# Patient Record
Sex: FEMALE | Race: BLACK OR AFRICAN AMERICAN | NOT HISPANIC OR LATINO | Employment: UNEMPLOYED | URBAN - METROPOLITAN AREA
[De-identification: names, ages, dates, MRNs, and addresses within clinical notes are randomized per-mention and may not be internally consistent; named-entity substitution may affect disease eponyms.]

---

## 2017-02-08 ENCOUNTER — APPOINTMENT (OUTPATIENT)
Dept: OPTOMETRY | Facility: CLINIC | Age: 67
End: 2017-02-08
Payer: COMMERCIAL

## 2017-02-08 ENCOUNTER — OFFICE VISIT (OUTPATIENT)
Dept: OPTOMETRY | Facility: CLINIC | Age: 67
End: 2017-02-08
Payer: COMMERCIAL

## 2017-02-08 DIAGNOSIS — H52.03 HYPEROPIA OF BOTH EYES WITH ASTIGMATISM AND PRESBYOPIA: ICD-10-CM

## 2017-02-08 DIAGNOSIS — H52.4 HYPEROPIA OF BOTH EYES WITH ASTIGMATISM AND PRESBYOPIA: ICD-10-CM

## 2017-02-08 DIAGNOSIS — H52.203 HYPEROPIA OF BOTH EYES WITH ASTIGMATISM AND PRESBYOPIA: ICD-10-CM

## 2017-02-08 DIAGNOSIS — E11.9 TYPE 2 DIABETES MELLITUS WITHOUT RETINOPATHY (H): Primary | ICD-10-CM

## 2017-02-08 DIAGNOSIS — H26.9 CATARACTS, BOTH EYES: ICD-10-CM

## 2017-02-08 PROCEDURE — 92015 DETERMINE REFRACTIVE STATE: CPT | Performed by: OPTOMETRIST

## 2017-02-08 PROCEDURE — 92014 COMPRE OPH EXAM EST PT 1/>: CPT | Performed by: OPTOMETRIST

## 2017-02-08 PROCEDURE — 92341 FIT SPECTACLES BIFOCAL: CPT | Performed by: OPTOMETRIST

## 2017-02-08 ASSESSMENT — CONF VISUAL FIELD
OD_NORMAL: 1
OS_NORMAL: 1

## 2017-02-08 ASSESSMENT — REFRACTION_MANIFEST
OS_AXIS: 175
OD_SPHERE: +0.50
OD_AXIS: 180
OS_SPHERE: +0.25
OS_ADD: +2.50
OS_CYLINDER: +0.50
OD_CYLINDER: +0.50
OD_ADD: +2.50

## 2017-02-08 ASSESSMENT — VISUAL ACUITY
OD_CC: 20/40
OD_SC: 20/40
OS_SC: 20/40
OS_CC: 20/40
OD_CC: 20/40
METHOD: NUMBERS - LINEAR
OS_CC: 20/30

## 2017-02-08 ASSESSMENT — CUP TO DISC RATIO
OS_RATIO: 0.3
OD_RATIO: 0.3

## 2017-02-08 ASSESSMENT — REFRACTION_WEARINGRX
OS_AXIS: 175
OD_CYLINDER: +0.50
SPECS_TYPE: SVL
OS_SPHERE: PLANO
OD_SPHERE: PLANO
OS_ADD: +2.50
OS_CYLINDER: +0.50
OD_ADD: +2.50
OD_AXIS: 005

## 2017-02-08 ASSESSMENT — SLIT LAMP EXAM - LIDS
COMMENTS: NORMAL
COMMENTS: NORMAL

## 2017-02-08 ASSESSMENT — EXTERNAL EXAM - RIGHT EYE: OD_EXAM: NORMAL

## 2017-02-08 ASSESSMENT — TONOMETRY
OS_IOP_MMHG: 18
IOP_METHOD: APPLANATION
OD_IOP_MMHG: 18

## 2017-02-08 ASSESSMENT — EXTERNAL EXAM - LEFT EYE: OS_EXAM: NORMAL

## 2017-02-08 NOTE — PATIENT INSTRUCTIONS
Monitor, Keep blood sugar under control  Sent letter to primary care provider regarding diabetes  Monitor cataracts by having yearly exams.  Wear sunglasses when outside.  A final glasses prescription was given.  Allow time for adaptation.  The glasses may cause dizziness and affect depth perception for awhile.  Return to clinic 1 year for Comprehensive Vision Exam      Lisa Batista O.D  [address] [place]  [address]    [phone]

## 2017-02-08 NOTE — Clinical Note
[facility]  Optometry Department      2/8/2017    Re:  Ankush Cole  1950   [id number]    Dear Dr. Rubio,    Your patient was seen in our office on 2/8/2017 for a dilated diabetic eye exam.      Findings:    No Retinopathy  OU - Both  Mild Cataracts both eyes     Comments:  Ankush should return for a comprehensive eye exam in 1 year.    Sincerely,        Lisa Batista O.D  [facility] [address]  [place]  [address]    [phone]

## 2017-02-08 NOTE — PROGRESS NOTES
Chief Complaint   Patient presents with     Diabetic Eye Exam      Accompanied by  and daughter  A1C      6.5   12/30/2016  A1C      6.7   7/19/2016  A1C      6.3   11/16/2015  A1C      6.6   4/13/2015  A1C      6.2   1/8/2015        Accompanied by  and Accompanied by Daughter  Last Eye Exam: 05/19/2015  Dilated Previously: Yes    What are you currently using to see?  readers       Distance Vision Acuity: Satisfied with vision    Near Vision Acuity: Satisfied with vision while reading  with readers    Eye Comfort: good  Do you use eye drops? : No  Occupation or Hobbies: Retired    Renee L. F.  Opt. Tech.  2/8/2017         Medical, surgical and family histories reviewed and updated 2/8/2017.       OBJECTIVE: See Ophthalmology exam    ASSESSMENT:    ICD-10-CM    1. Type 2 diabetes mellitus without retinopathy (H) E11.9 EYE EXAM (SIMPLE-NONBILLABLE)   2. Cataracts, both eyes H26.9 EYE EXAM (SIMPLE-NONBILLABLE)   3. Hyperopia of both eyes with astigmatism and presbyopia H52.03 EYE EXAM (SIMPLE-NONBILLABLE)    H52.203 REFRACTION    H52.4       PLAN:   Monitor, Keep blood sugar under control  Sent letter to primary care provider regarding diabetes  Monitor cataracts by having yearly exams.  Wear sunglasses when outside.  A final glasses prescription was given.  Allow time for adaptation.  The glasses may cause dizziness and affect depth perception for awhile.  Return to clinic 1 year for Comprehensive Vision Exam      Lisa Batista O.D  [address]  [place]  [address]    [phone]

## 2017-02-08 NOTE — MR AVS SNAPSHOT
After Visit Summary   2/8/2017    Ankush Cole    MRN: [id number]           Patient Information     Date Of Birth          1950        Visit Information        Provider Department      2/8/2017 8:15 AM Lisa Batista OD; [employer] [facility]        Today's Diagnoses     Type 2 diabetes mellitus without retinopathy (H)    -  1     Cataracts, both eyes         Hyperopia of both eyes with astigmatism and presbyopia           Care Instructions        Monitor, Keep blood sugar under control  Sent letter to primary care provider regarding diabetes  Monitor cataracts by having yearly exams.  Wear sunglasses when outside.  A final glasses prescription was given.  Allow time for adaptation.  The glasses may cause dizziness and affect depth perception for awhile.  Return to clinic 1 year for Comprehensive Vision Exam      Lisa Batista O.D  [address] [place]  [address]    [phone]              Follow-ups after your visit        Follow-up notes from your care team     Return in about 1 year (around 2/8/2018) for Eye Exam.      Who to contact     If you have questions or need follow up information about today's clinic visit or your schedule please contact [facility] directly at [phone].  Normal or non-critical lab and imaging results will be communicated to you by MyChart, letter or phone within 4 business days after the clinic has received the results. If you do not hear from us within 7 days, please contact the clinic through [employer] or phone. If you have a critical or abnormal lab result, we will notify you by phone as soon as possible.  Submit refill requests through [employer] or call your pharmacy and they will forward the refill request to us. Please allow 3 business days for your refill to be completed.          Additional Information About Your Visit        [employer] Information     [employer] lets  "you send messages to your doctor, view your test results, renew your prescriptions, schedule appointments and more. To sign up, go to [email or web address] . Click on \"Log in\" on the left side of the screen, which will take you to the Welcome page. Then click on \"Sign up Now\" on the right side of the page.     You will be asked to enter the access code listed below, as well as some personal information. Please follow the directions to create your username and password.     Your access code is: [email or web address]  Expires: 3/30/2017  4:00 PM     Your access code will  in 90 days. If you need help or a new code, please call your [facility] clinic or [phone].        Care EveryWhere ID     This is your Care EveryWhere ID. This could be used by other organizations to access your [facility] medical records  [id number]         Blood Pressure from Last 3 Encounters:   16 140/72   16 150/68   16 118/80    Weight from Last 3 Encounters:   16 89.359 kg (197 lb)   16 92.534 kg (204 lb)   16 88.451 kg (195 lb)              We Performed the Following     EYE EXAM (SIMPLE-NONBILLABLE)     REFRACTION        Primary Care Provider Office Phone # Fax #    Sherrill Rubio [phone]       [address] [address]        Thank you!     Thank you for choosing [facility]  for your care. Our goal is always to provide you with excellent care. Hearing back from our patients is one way we can continue to improve our services. Please take a few minutes to complete the written survey that you may receive in the mail after your visit with us. Thank you!             Your Updated Medication List - Protect others around you: Learn how to safely use, store and throw away your medicines at www.disposemymeds.org.          This list is accurate as of: 17  9:34 AM.  Always use your most recent med list.                   " Brand Name Dispense Instructions for use    acetaminophen 325 MG tablet    TYLENOL    100 tablet    Take 2 tablets (650 mg) by mouth 3 times daily as needed for mild pain       ALOE VERA PO      Take 600 mg by mouth       diclofenac 1 % Gel topical gel    VOLTAREN    100 g    Apply 4 grams to knees or 2 grams to hands four times daily using enclosed dosing card.       lidocaine 5 % ointment    XYLOCAINE    30 g    Apply topically as needed for moderate pain Apply to pea side daily prn with pain       naproxen 500 MG tablet    NAPROSYN    60 tablet    Take 1 tablet (500 mg) by mouth 2 times daily as needed for moderate pain       * vitamin D 2000 UNITS tablet     100 tablet    Take 2,000 Units by mouth daily       * cholecalciferol 11521 UNITS capsule    VITAMIN D3    4 capsule    Take 1 capsule (50,000 Units) by mouth once a week       * Notice:  This list has 2 medication(s) that are the same as other medications prescribed for you. Read the directions carefully, and ask your doctor or other care provider to review them with you.

## 2017-03-22 ENCOUNTER — TRANSFERRED RECORDS (OUTPATIENT)
Dept: HEALTH INFORMATION MANAGEMENT | Facility: CLINIC | Age: 67
End: 2017-03-22

## 2017-03-24 ENCOUNTER — OFFICE VISIT (OUTPATIENT)
Dept: FAMILY MEDICINE | Facility: CLINIC | Age: 67
End: 2017-03-24
Payer: COMMERCIAL

## 2017-03-24 VITALS
DIASTOLIC BLOOD PRESSURE: 74 MMHG | HEIGHT: 61 IN | HEART RATE: 72 BPM | TEMPERATURE: 98 F | BODY MASS INDEX: 37.43 KG/M2 | SYSTOLIC BLOOD PRESSURE: 134 MMHG | WEIGHT: 198.25 LBS

## 2017-03-24 DIAGNOSIS — Z00.00 ENCOUNTER FOR ROUTINE ADULT HEALTH EXAMINATION WITHOUT ABNORMAL FINDINGS: Primary | ICD-10-CM

## 2017-03-24 DIAGNOSIS — E78.00 ELEVATED CHOLESTEROL: ICD-10-CM

## 2017-03-24 DIAGNOSIS — M81.0 OSTEOPOROSIS: ICD-10-CM

## 2017-03-24 DIAGNOSIS — E11.9 TYPE 2 DIABETES MELLITUS WITHOUT COMPLICATION, WITHOUT LONG-TERM CURRENT USE OF INSULIN (H): ICD-10-CM

## 2017-03-24 DIAGNOSIS — E04.1 THYROID NODULE: ICD-10-CM

## 2017-03-24 DIAGNOSIS — E55.9 VITAMIN D DEFICIENCY: ICD-10-CM

## 2017-03-24 DIAGNOSIS — M17.9 OSTEOARTHRITIS OF KNEE, UNSPECIFIED LATERALITY, UNSPECIFIED OSTEOARTHRITIS TYPE: ICD-10-CM

## 2017-03-24 DIAGNOSIS — D25.9 UTERINE LEIOMYOMA, UNSPECIFIED LOCATION: ICD-10-CM

## 2017-03-24 LAB — HBA1C MFR BLD: 6.2 % (ref 4.3–6)

## 2017-03-24 PROCEDURE — 83036 HEMOGLOBIN GLYCOSYLATED A1C: CPT | Performed by: FAMILY MEDICINE

## 2017-03-24 PROCEDURE — 80061 LIPID PANEL: CPT | Performed by: FAMILY MEDICINE

## 2017-03-24 PROCEDURE — 82306 VITAMIN D 25 HYDROXY: CPT | Performed by: FAMILY MEDICINE

## 2017-03-24 PROCEDURE — 36415 COLL VENOUS BLD VENIPUNCTURE: CPT | Performed by: FAMILY MEDICINE

## 2017-03-24 PROCEDURE — 99397 PER PM REEVAL EST PAT 65+ YR: CPT | Performed by: FAMILY MEDICINE

## 2017-03-24 RX ORDER — OMEGA-3-ACID ETHYL ESTERS 1 G/1
2 CAPSULE, LIQUID FILLED ORAL DAILY
Qty: 120 CAPSULE | Refills: 0 | Status: SHIPPED | OUTPATIENT
Start: 2017-03-24 | End: 2018-05-02

## 2017-03-24 RX ORDER — IBUPROFEN 600 MG/1
600 TABLET, FILM COATED ORAL EVERY 6 HOURS PRN
Qty: 60 TABLET | Refills: 3 | Status: SHIPPED | OUTPATIENT
Start: 2017-03-24 | End: 2018-05-02

## 2017-03-24 NOTE — PATIENT INSTRUCTIONS
Think about starting cholesterol med  We will recheck today  Follow up 6 months    Preventive Health Recommendations  Female Ages 65 +    Yearly exam:     See your health care provider every year in order to  o Review health changes.   o Discuss preventive care.    o Review your medicines if your doctor has prescribed any.      You no longer need a yearly Pap test unless you've had an abnormal Pap test in the past 10 years. If you have vaginal symptoms, such as bleeding or discharge, be sure to talk with your provider about a Pap test.      Every 1 to 2 years, have a mammogram.  If you are over 69, talk with your health care provider about whether or not you want to continue having screening mammograms.      Every 10 years, have a colonoscopy. Or, have a yearly FIT test (stool test). These exams will check for colon cancer.       Have a cholesterol test every 5 years, or more often if your doctor advises it.       Have a diabetes test (fasting glucose) every three years. If you are at risk for diabetes, you should have this test more often.       At age 65, have a bone density scan (DEXA) to check for osteoporosis (brittle bone disease).    Shots:    Get a flu shot each year.    Get a tetanus shot every 10 years.    Talk to your doctor about your pneumonia vaccines. There are now two you should receive - Pneumovax (PPSV 23) and Prevnar (PCV 13).    Talk to your doctor about the shingles vaccine.    Talk to your doctor about the hepatitis B vaccine.    Nutrition:     Eat at least 5 servings of fruits and vegetables each day.      Eat whole-grain bread, whole-wheat pasta and brown rice instead of white grains and rice.      Talk to your provider about Calcium and Vitamin D.     Lifestyle    Exercise at least 150 minutes a week (30 minutes a day, 5 days a week). This will help you control your weight and prevent disease.      Limit alcohol to one drink per day.      No smoking.       Wear sunscreen to prevent skin  cancer.       See your dentist twice a year for an exam and cleaning.      See your eye doctor every 1 to 2 years to screen for conditions such as glaucoma, macular degeneration, cataracts, etc

## 2017-03-24 NOTE — PROGRESS NOTES
SUBJECTIVE:                                                            Ankush Cole is a 67 year old female who presents for Preventive Visit.      Are you in the first 12 months of your Medicare Part B coverage?  No    Healthy Habits:    Do you get at least three servings of calcium containing foods daily (dairy, green leafy vegetables, etc.)? No    Amount of exercise or daily activities, outside of work: walking    Problems taking medications regularly No    Medication side effects: No    Have you had an eye exam in the past two years? yes    Do you see a dentist twice per year? yes    Do you have sleep apnea, excessive snoring or daytime drowsiness?no    COGNITIVE SCREEN  1) Repeat 3 items (Banana, Sunrise, Chair)  - normal passed  2) Clock draw: unable to do  3) 3 item recall: passed  Results: N/A    Mini-CogTM Copyright S Rudy. Licensed by the author for use in [facility]; reprinted with permission (ryan@[facility]). All rights reserved.      Doing well, no compliats  DIABETES MELLITUS -well controlled, eating better, walking more, no meds            Reviewed and updated as needed this visit by clinical staff  Tobacco  Allergies  Med Hx  Surg Hx  Fam Hx  Soc Hx        Reviewed and updated as needed this visit by Provider        Social History   Substance Use Topics     Smoking status: Never Smoker     Smokeless tobacco: Never Used     Alcohol use No       The patient does not drink >3 drinks per day nor >7 drinks per week.    Today's PHQ-2 Score:   PHQ-2 ( 1999 Pfizer) 3/24/2017 12/30/2016   Q1: Little interest or pleasure in doing things 0 0   Q2: Feeling down, depressed or hopeless 0 0   PHQ-2 Score 0 0       Do you feel safe in your environment - Yes    Do you have a Health Care Directive?: Has verbally with someone    Current providers sharing in care for this patient include:   Patient Care Team:  Sherrill Rubio DO as PCP - General (Family Practice)  Suzette Blanc  MD as MD (Student in organized health care education/training program)      Hearing impairment: No    Ability to successfully perform activities of daily living: Yes, no assistance needed     Fall risk:  Fallen 2 or more times in the past year?: No  Any fall with injury in the past year?: No    Home safety:  none identified      The following health maintenance items are reviewed in Epic and correct as of today:  Health Maintenance   Topic Date Due     ADVANCE DIRECTIVE PLANNING Q5 YRS (NO INBASKET)  03/23/1966     A1C Q6 MO( NO INBASKET)  06/30/2017     CREATININE Q1 YEAR (NO INBASKET)  07/19/2017     FALL RISK ASSESSMENT  07/19/2017     LIPID MONITORING Q1 YEAR( NO INBASKET)  07/19/2017     MICROALBUMIN Q1 YEAR( NO INBASKET)  07/19/2017     INFLUENZA VACCINE (SYSTEM ASSIGNED)  09/01/2017     PAP Q3 YR  09/18/2017     HPV Q3 Years  09/18/2017     FOOT EXAM Q1 YEAR( NO INBASKET)  12/30/2017     EYE EXAM Q1 YEAR( NO INBASKET)  02/08/2018     MAMMO SCREEN Q2 YR (SYSTEM ASSIGNED)  07/29/2018     TSH W/ FREE T4 REFLEX Q2 YEAR (NO INBASKET)  09/20/2018     TETANUS IMMUNIZATION (SYSTEM ASSIGNED)  09/18/2024     COLON CANCER SCREEN (SYSTEM ASSIGNED)  03/22/2027     DEXA SCAN SCREENING (SYSTEM ASSIGNED)  Completed     PNEUMOCOCCAL  Completed     HEPATITIS C SCREENING  Completed         Pneumonia Vaccine:Adults age 65+ who have not received previous Pneumovax (PPSV23) or PCV13 as an adult: Should first be given PCV13 AND then should be given PPSV23 6-12 months after PCV13     ROS:  Constitutional, HEENT, cardiovascular, pulmonary, GI, , musculoskeletal, neuro, skin, endocrine and psych systems are negative, except as otherwise noted.    Problem list, Medication list, Allergies, and Medical/Social/Surgical histories reviewed in [facility] and updated as appropriate.  OBJECTIVE:                                                            /74 (BP Location: Right arm, Cuff Size: Adult Regular)  Pulse 72  Temp 98  F (36.7  C)  "(Oral)  Ht 5' 1\" (1.549 m)  Wt 198 lb 4 oz (89.9 kg)  BMI 37.46 kg/m2 Estimated body mass index is 37.46 kg/(m^2) as calculated from the following:    Height as of this encounter: 5' 1\" (1.549 m).    Weight as of this encounter: 198 lb 4 oz (89.9 kg).  EXAM:   GENERAL: healthy, alert and no distress  EYES: Eyes grossly normal to inspection, PERRL and conjunctivae and sclerae normal  HENT: ear canals and TM's normal, nose and mouth without ulcers or lesions  NECK: no adenopathy, no asymmetry, masses, or scars and thyroid normal to palpation  RESP: lungs clear to auscultation - no rales, rhonchi or wheezes  BREAST: normal without masses, tenderness or nipple discharge and no palpable axillary masses or adenopathy  CV: regular rate and rhythm, normal S1 S2, no S3 or S4, no murmur, click or rub, no peripheral edema and peripheral pulses strong  ABDOMEN: soft, nontender, no hepatosplenomegaly, no masses and bowel sounds normal  MS: no gross musculoskeletal defects noted, no edema  SKIN: no suspicious lesions or rashes  NEURO: Normal strength and tone, mentation intact and speech normal  PSYCH: mentation appears normal, affect normal/bright    ASSESSMENT / PLAN:                                                                ICD-10-CM    1. Type 2 diabetes mellitus without complication (H) E11.9 HEMOGLOBIN A1C     Basic metabolic panel   2. Uterine leiomyoma, unspecified location D25.9    3. Thyroid nodule E04.1    4. Osteoporosis M81.0    5. Elevated cholesterol E78.00 omega-3 acid ethyl esters (LOVAZA) 1 G capsule     Lipid Profile with reflex to direct LDL   6. Osteoarthritis of knee, unspecified laterality, unspecified osteoarthritis type M17.9 ibuprofen (ADVIL/MOTRIN) 600 MG tablet   7. Vitamin D deficiency E55.9 Vitamin D Deficiency     cholecalciferol (VITAMIN D3) 86517 UNITS capsule     Diet controlled diabetes mellitus-stable, get eye exam done  History of fibroids, no symptoms  Vit D def-cont " "supplements  Arthritis pain-stable    End of Life Planning:  Patient currently has an advanced directive: Yes.  Practitioner is supportive of decision.    COUNSELING:  Reviewed preventive health counseling, as reflected in patient instructions        Estimated body mass index is 37.46 kg/(m^2) as calculated from the following:    Height as of this encounter: 5' 1\" (1.549 m).    Weight as of this encounter: 198 lb 4 oz (89.9 kg).     reports that she has never smoked. She has never used smokeless tobacco.      Appropriate preventive services were discussed with this patient, including applicable screening as appropriate for cardiovascular disease, diabetes, osteopenia/osteoporosis, and glaucoma.  As appropriate for age/gender, discussed screening for colorectal cancer, prostate cancer, breast cancer, and cervical cancer. Checklist reviewing preventive services available has been given to the patient.    Reviewed patients plan of care and provided an AVS. The Basic Care Plan (routine screening as documented in Health Maintenance) for Ankush meets the Care Plan requirement. This Care Plan has been established and reviewed with the Patient.    Counseling Resources:  ATP IV Guidelines  Pooled Cohorts Equation Calculator  Breast Cancer Risk Calculator  FRAX Risk Assessment  ICSI Preventive Guidelines  Dietary Guidelines for Americans, 2010  USDA's MyPlate  ASA Prophylaxis  Lung CA Screening    Sherrill Rubio DO  [facility]  "

## 2017-03-24 NOTE — LETTER
[facility]  [address] [address] [phone]      April 10, 2017      Ankush Cole  [address]  [place] [address]          Dear Ms. Cole    The results of your recent lab tests were within normal limits. Enclosed is a copy of these results.  If you have any further questions or problems, please contact our office.    Results for orders placed or performed in visit on 03/24/17   HEMOGLOBIN A1C   Result Value Ref Range    Hemoglobin A1C 6.2 (H) 4.3 - 6.0 %   Vitamin D Deficiency   Result Value Ref Range    Vitamin D Deficiency screening 45 20 - 75 ug/L   Lipid Profile with reflex to direct LDL   Result Value Ref Range    Cholesterol 184 <200 mg/dL    Triglycerides 54 <150 mg/dL    HDL Cholesterol 71 >49 mg/dL    LDL Cholesterol Calculated 102 (H) <100 mg/dL    Non HDL Cholesterol 113 <130 mg/dL         Sincerely,      Sherrill Rubio DO/ashely

## 2017-03-24 NOTE — MR AVS SNAPSHOT
After Visit Summary   3/24/2017    Ankush Cole    MRN: [id number]           Patient Information     Date Of Birth          1950        Visit Information        Provider Department      3/24/2017 10:30 AM Sherrill Rubio DO; [employer] [facility]        Today's Diagnoses     Type 2 diabetes mellitus without complication (H)    -  1    Uterine leiomyoma, unspecified location        Thyroid nodule        Osteoporosis        Elevated cholesterol        Osteoarthritis of knee, unspecified laterality, unspecified osteoarthritis type        Vitamin D deficiency          Care Instructions    Think about starting cholesterol med  We will recheck today  Follow up 6 months    Preventive Health Recommendations  Female Ages 65 +    Yearly exam:     See your health care provider every year in order to  o Review health changes.   o Discuss preventive care.    o Review your medicines if your doctor has prescribed any.      You no longer need a yearly Pap test unless you've had an abnormal Pap test in the past 10 years. If you have vaginal symptoms, such as bleeding or discharge, be sure to talk with your provider about a Pap test.      Every 1 to 2 years, have a mammogram.  If you are over 69, talk with your health care provider about whether or not you want to continue having screening mammograms.      Every 10 years, have a colonoscopy. Or, have a yearly FIT test (stool test). These exams will check for colon cancer.       Have a cholesterol test every 5 years, or more often if your doctor advises it.       Have a diabetes test (fasting glucose) every three years. If you are at risk for diabetes, you should have this test more often.       At age 65, have a bone density scan (DEXA) to check for osteoporosis (brittle bone disease).    Shots:    Get a flu shot each year.    Get a tetanus shot every 10 years.    Talk to your doctor about your pneumonia vaccines.  "There are now two you should receive - Pneumovax (PPSV 23) and Prevnar (PCV 13).    Talk to your doctor about the shingles vaccine.    Talk to your doctor about the hepatitis B vaccine.    Nutrition:     Eat at least 5 servings of fruits and vegetables each day.      Eat whole-grain bread, whole-wheat pasta and brown rice instead of white grains and rice.      Talk to your provider about Calcium and Vitamin D.     Lifestyle    Exercise at least 150 minutes a week (30 minutes a day, 5 days a week). This will help you control your weight and prevent disease.      Limit alcohol to one drink per day.      No smoking.       Wear sunscreen to prevent skin cancer.       See your dentist twice a year for an exam and cleaning.      See your eye doctor every 1 to 2 years to screen for conditions such as glaucoma, macular degeneration, cataracts, etc         Follow-ups after your visit        Who to contact     If you have questions or need follow up information about today's clinic visit or your schedule please contact [facility] directly at [phone].  Normal or non-critical lab and imaging results will be communicated to you by MyChart, letter or phone within 4 business days after the clinic has received the results. If you do not hear from us within 7 days, please contact the clinic through Crocshart or phone. If you have a critical or abnormal lab result, we will notify you by phone as soon as possible.  Submit refill requests through Crashly[employer] or call your pharmacy and they will forward the refill request to us. Please allow 3 business days for your refill to be completed.          Additional Information About Your Visit        [employer] Information     [employer] lets you send messages to your doctor, view your test results, renew your prescriptions, schedule appointments and more. To sign up, go to [email or web address] . Click on \"Log in\" on the left side of the screen, which will take you to the " "Welcome page. Then click on \"Sign up Now\" on the right side of the page.     You will be asked to enter the access code listed below, as well as some personal information. Please follow the directions to create your username and password.     Your access code is: [email or web address]  Expires: 3/30/2017  5:00 PM     Your access code will  in 90 days. If you need help or a new code, please call your [facility] clinic or [phone].        Care EveryWhere ID     This is your Care EveryWhere ID. This could be used by other organizations to access your [facility] medical records  [id number]        Your Vitals Were     Pulse Temperature Height BMI (Body Mass Index)          72 98  F (36.7  C) (Oral) 5' 1\" (1.549 m) 37.46 kg/m2         Blood Pressure from Last 3 Encounters:   17 134/74   16 140/72   16 150/68    Weight from Last 3 Encounters:   17 198 lb 4 oz (89.9 kg)   16 197 lb (89.4 kg)   16 204 lb (92.5 kg)              We Performed the Following     HEMOGLOBIN A1C     Lipid Profile with reflex to direct LDL     Vitamin D Deficiency          Today's Medication Changes          These changes are accurate as of: 3/24/17 11:43 AM.  If you have any questions, ask your nurse or doctor.               Start taking these medicines.        Dose/Directions    ibuprofen 600 MG tablet   Commonly known as:  ADVIL/MOTRIN   Used for:  Osteoarthritis of knee, unspecified laterality, unspecified osteoarthritis type   Started by:  Sherrill Rubio DO        Dose:  600 mg   Take 1 tablet (600 mg) by mouth every 6 hours as needed for moderate pain   Quantity:  60 tablet   Refills:  3       omega-3 acid ethyl esters 1 G capsule   Commonly known as:  Lovaza   Used for:  Elevated cholesterol   Started by:  Sherrill Rubio DO        Dose:  2 g   Take 2 capsules (2 g) by mouth daily   Quantity:  120 capsule   Refills:  0         These medicines have changed or have updated prescriptions.     "    Dose/Directions    * vitamin D 2000 UNITS tablet   This may have changed:  Another medication with the same name was added. Make sure you understand how and when to take each.   Used for:  Vitamin D deficiency   Changed by:  Sherrill Rubio DO        Dose:  2000 Units   Take 2,000 Units by mouth daily   Quantity:  100 tablet   Refills:  3       * cholecalciferol 47496 UNITS capsule   Commonly known as:  VITAMIN D3   This may have changed:  Another medication with the same name was added. Make sure you understand how and when to take each.   Used for:  Vitamin D deficiency   Changed by:  Sherrill Rubio DO        Dose:  1 capsule   Take 1 capsule (50,000 Units) by mouth once a week   Quantity:  4 capsule   Refills:  5       * cholecalciferol 12609 UNITS capsule   Commonly known as:  VITAMIN D3   This may have changed:  You were already taking a medication with the same name, and this prescription was added. Make sure you understand how and when to take each.   Used for:  Vitamin D deficiency   Changed by:  Sherrill Rubio DO        Dose:  1 capsule   Take 1 capsule (50,000 Units) by mouth once a week   Quantity:  10 capsule   Refills:  1       * Notice:  This list has 3 medication(s) that are the same as other medications prescribed for you. Read the directions carefully, and ask your doctor or other care provider to review them with you.         Where to get your medicines      These medications were sent to [facility] [address].  [address] [place] [address], [place] [id number]     Phone:  [phone]     cholecalciferol 75961 UNITS capsule    ibuprofen 600 MG tablet    omega-3 acid ethyl esters 1 G capsule                Primary Care Provider Office Phone # Fax #    Sherrill Rubio [phone]       [address] [address]        Thank you!     Thank you for  choosing [facility] [place] [facility]  for your care. Our goal is always to provide you with excellent care. Hearing back from our patients is one way we can continue to improve our services. Please take a few minutes to complete the written survey that you may receive in the mail after your visit with us. Thank you!             Your Updated Medication List - Protect others around you: Learn how to safely use, store and throw away your medicines at www.disposemymeds.org.          This list is accurate as of: 3/24/17 11:43 AM.  Always use your most recent med list.                   Brand Name Dispense Instructions for use    acetaminophen 325 MG tablet    TYLENOL    100 tablet    Take 2 tablets (650 mg) by mouth 3 times daily as needed for mild pain       ALOE VERA PO      Take 600 mg by mouth       diclofenac 1 % Gel topical gel    VOLTAREN    100 g    Apply 4 grams to knees or 2 grams to hands four times daily using enclosed dosing card.       ibuprofen 600 MG tablet    ADVIL/MOTRIN    60 tablet    Take 1 tablet (600 mg) by mouth every 6 hours as needed for moderate pain       lidocaine 5 % ointment    XYLOCAINE    30 g    Apply topically as needed for moderate pain Apply to pea side daily prn with pain       naproxen 500 MG tablet    NAPROSYN    60 tablet    Take 1 tablet (500 mg) by mouth 2 times daily as needed for moderate pain       omega-3 acid ethyl esters 1 G capsule    Lovaza    120 capsule    Take 2 capsules (2 g) by mouth daily       * vitamin D 2000 UNITS tablet     100 tablet    Take 2,000 Units by mouth daily       * cholecalciferol 83348 UNITS capsule    VITAMIN D3    4 capsule    Take 1 capsule (50,000 Units) by mouth once a week       * cholecalciferol 93545 UNITS capsule    VITAMIN D3    10 capsule    Take 1 capsule (50,000 Units) by mouth once a week       * Notice:  This list has 3 medication(s) that are the same as other medications prescribed for you. Read the directions carefully, and ask  your doctor or other care provider to review them with you.

## 2017-03-24 NOTE — NURSING NOTE
"Chief Complaint   Patient presents with     Physical       Initial /74 (BP Location: Right arm, Cuff Size: Adult Regular)  Pulse 72  Temp 98  F (36.7  C) (Oral)  Ht 5' 1\" (1.549 m)  Wt 198 lb 4 oz (89.9 kg)  BMI 37.46 kg/m2 Estimated body mass index is 37.46 kg/(m^2) as calculated from the following:    Height as of this encounter: 5' 1\" (1.549 m).    Weight as of this encounter: 198 lb 4 oz (89.9 kg).  Medication Reconciliation: jaret WILKES, Certified Medical Assistant (AAMA)March 24, 2017 11:08 AM      "

## 2017-03-25 LAB
CHOLEST SERPL-MCNC: 184 MG/DL
HDLC SERPL-MCNC: 71 MG/DL
LDLC SERPL CALC-MCNC: 102 MG/DL
NONHDLC SERPL-MCNC: 113 MG/DL
TRIGL SERPL-MCNC: 54 MG/DL

## 2017-03-27 LAB — DEPRECATED CALCIDIOL+CALCIFEROL SERPL-MC: 45 UG/L (ref 20–75)

## 2017-03-28 PROBLEM — E11.9 TYPE 2 DIABETES MELLITUS WITHOUT COMPLICATION, WITHOUT LONG-TERM CURRENT USE OF INSULIN (H): Status: ACTIVE | Noted: 2017-03-28

## 2017-05-09 ENCOUNTER — TELEPHONE (OUTPATIENT)
Dept: FAMILY MEDICINE | Facility: CLINIC | Age: 67
End: 2017-05-09

## 2017-05-09 NOTE — TELEPHONE ENCOUNTER
Panel Management Review      Patient has the following on her problem list:     Diabetes    ASA:  Not on File    Last A1C  Lab Results   Component Value Date    A1C 6.2 03/24/2017    A1C 6.5 12/30/2016    A1C 6.7 07/19/2016    A1C 6.3 11/16/2015    A1C 6.6 04/13/2015     A1C tested: Passed    Last LDL:    Lab Results   Component Value Date    CHOL 184 03/24/2017     Lab Results   Component Value Date    HDL 71 03/24/2017     Lab Results   Component Value Date     03/24/2017     Lab Results   Component Value Date    TRIG 54 03/24/2017     Lab Results   Component Value Date    CHOLHDLRATIO 2.2 08/29/2014     Lab Results   Component Value Date    NHDL 113 03/24/2017       Is the patient on a Statin? NO             Is the patient on Aspirin? NO        Last three blood pressure readings:  BP Readings from Last 3 Encounters:   03/24/17 134/74   12/30/16 140/72   09/20/16 150/68       Date of last diabetes office visit: 3/24/17     Tobacco History:     History   Smoking Status     Never Smoker   Smokeless Tobacco     Never Used           Composite cancer screening  Chart review shows that this patient is due/due soon for the following None  Summary:    Patient is due/failing the following:   STATIN    Action needed:   Routed to provider for review.    Type of outreach:    None, routed to provider for review.    Questions for provider review:    Patient on the fail list for STATIN- please review to see if any outreach at this time                                                                                                                                    Doreen Boggs MA       Chart routed to Provider .

## 2017-11-18 ENCOUNTER — HEALTH MAINTENANCE LETTER (OUTPATIENT)
Age: 67
End: 2017-11-18

## 2017-11-21 ENCOUNTER — OFFICE VISIT (OUTPATIENT)
Dept: FAMILY MEDICINE | Facility: CLINIC | Age: 67
End: 2017-11-21
Payer: COMMERCIAL

## 2017-11-21 VITALS
DIASTOLIC BLOOD PRESSURE: 70 MMHG | HEART RATE: 68 BPM | BODY MASS INDEX: 38.21 KG/M2 | HEIGHT: 61 IN | SYSTOLIC BLOOD PRESSURE: 128 MMHG | WEIGHT: 202.4 LBS | TEMPERATURE: 98.2 F

## 2017-11-21 DIAGNOSIS — M17.9 OSTEOARTHRITIS OF KNEE, UNSPECIFIED LATERALITY, UNSPECIFIED OSTEOARTHRITIS TYPE: ICD-10-CM

## 2017-11-21 DIAGNOSIS — E11.9 TYPE 2 DIABETES MELLITUS WITHOUT COMPLICATION, WITHOUT LONG-TERM CURRENT USE OF INSULIN (H): Primary | ICD-10-CM

## 2017-11-21 DIAGNOSIS — E55.9 VITAMIN D DEFICIENCY: ICD-10-CM

## 2017-11-21 DIAGNOSIS — M19.90 OSTEOARTHRITIS, UNSPECIFIED OSTEOARTHRITIS TYPE, UNSPECIFIED SITE: ICD-10-CM

## 2017-11-21 LAB
ANION GAP SERPL CALCULATED.3IONS-SCNC: 7 MMOL/L (ref 3–14)
BUN SERPL-MCNC: 12 MG/DL (ref 7–30)
CALCIUM SERPL-MCNC: 9.3 MG/DL (ref 8.5–10.1)
CHLORIDE SERPL-SCNC: 106 MMOL/L (ref 94–109)
CO2 SERPL-SCNC: 27 MMOL/L (ref 20–32)
CREAT SERPL-MCNC: 0.68 MG/DL (ref 0.52–1.04)
CREAT UR-MCNC: 58 MG/DL
ERYTHROCYTE [DISTWIDTH] IN BLOOD BY AUTOMATED COUNT: 14.1 % (ref 10–15)
GFR SERPL CREATININE-BSD FRML MDRD: 86 ML/MIN/1.7M2
GLUCOSE SERPL-MCNC: 114 MG/DL (ref 70–99)
HBA1C MFR BLD: 6.2 % (ref 4.3–6)
HCT VFR BLD AUTO: 41 % (ref 35–47)
HGB BLD-MCNC: 13.1 G/DL (ref 11.7–15.7)
MCH RBC QN AUTO: 28.7 PG (ref 26.5–33)
MCHC RBC AUTO-ENTMCNC: 32 G/DL (ref 31.5–36.5)
MCV RBC AUTO: 90 FL (ref 78–100)
MICROALBUMIN UR-MCNC: <5 MG/L
MICROALBUMIN/CREAT UR: NORMAL MG/G CR (ref 0–25)
PLATELET # BLD AUTO: 231 10E9/L (ref 150–450)
POTASSIUM SERPL-SCNC: 4 MMOL/L (ref 3.4–5.3)
RBC # BLD AUTO: 4.56 10E12/L (ref 3.8–5.2)
SODIUM SERPL-SCNC: 140 MMOL/L (ref 133–144)
WBC # BLD AUTO: 7 10E9/L (ref 4–11)

## 2017-11-21 PROCEDURE — 83036 HEMOGLOBIN GLYCOSYLATED A1C: CPT | Performed by: FAMILY MEDICINE

## 2017-11-21 PROCEDURE — 36415 COLL VENOUS BLD VENIPUNCTURE: CPT | Performed by: FAMILY MEDICINE

## 2017-11-21 PROCEDURE — 99214 OFFICE O/P EST MOD 30 MIN: CPT | Performed by: FAMILY MEDICINE

## 2017-11-21 PROCEDURE — 85027 COMPLETE CBC AUTOMATED: CPT | Performed by: FAMILY MEDICINE

## 2017-11-21 PROCEDURE — 80048 BASIC METABOLIC PNL TOTAL CA: CPT | Performed by: FAMILY MEDICINE

## 2017-11-21 PROCEDURE — 82043 UR ALBUMIN QUANTITATIVE: CPT | Performed by: FAMILY MEDICINE

## 2017-11-21 RX ORDER — NAPROXEN 500 MG/1
500 TABLET ORAL 2 TIMES DAILY PRN
Qty: 60 TABLET | Refills: 2 | Status: SHIPPED | OUTPATIENT
Start: 2017-11-21 | End: 2020-11-04

## 2017-11-21 RX ORDER — SULFASALAZINE 500 MG
TABLET ORAL
Status: CANCELLED | OUTPATIENT
Start: 2017-11-21

## 2017-11-21 NOTE — MR AVS SNAPSHOT
"              After Visit Summary   11/21/2017    Ankush Cole    MRN: [id number]           Patient Information     Date Of Birth          1950        Visit Information        Provider Department      11/21/2017 7:30 AM Sherrill Rubio DO; [employer] [facility]        Today's Diagnoses     Type 2 diabetes mellitus without complication, without long-term current use of insulin (H)    -  1    Vitamin D deficiency        Osteoarthritis, unspecified osteoarthritis type, unspecified site        Osteoarthritis of knee, unspecified laterality, unspecified osteoarthritis type          Care Instructions    Please continue current meds  Follow up in 6 months  Sherrill Rubio D.O.            Follow-ups after your visit        Who to contact     If you have questions or need follow up information about today's clinic visit or your schedule please contact [facility] directly at [phone].  Normal or non-critical lab and imaging results will be communicated to you by MyChart, letter or phone within 4 business days after the clinic has received the results. If you do not hear from us within 7 days, please contact the clinic through Pro.comhart or phone. If you have a critical or abnormal lab result, we will notify you by phone as soon as possible.  Submit refill requests through [employer] or call your pharmacy and they will forward the refill request to us. Please allow 3 business days for your refill to be completed.          Additional Information About Your Visit        MyChart Information     [employer] lets you send messages to your doctor, view your test results, renew your prescriptions, schedule appointments and more. To sign up, go to [email or web address] . Click on \"Log in\" on the left side of the screen, which will take you to the Welcome page. Then click on \"Sign up Now\" on the right side of the page.     You will be asked to enter the access code " "listed below, as well as some personal information. Please follow the directions to create your username and password.     Your access code is: [email or web address]  Expires: 2018  9:36 AM     Your access code will  in 90 days. If you need help or a new code, please call your [facility] clinic or [phone].        Care EveryWhere ID     This is your Care EveryWhere ID. This could be used by other organizations to access your [facility] medical records  [id number]        Your Vitals Were     Pulse Temperature Height BMI (Body Mass Index)          68 98.2  F (36.8  C) (Oral) 5' 1\" (1.549 m) 38.24 kg/m2         Blood Pressure from Last 3 Encounters:   17 128/70   17 134/74   16 140/72    Weight from Last 3 Encounters:   17 202 lb 6.4 oz (91.8 kg)   17 198 lb 4 oz (89.9 kg)   16 197 lb (89.4 kg)              We Performed the Following     Albumin Random Urine Quantitative with Creat Ratio     Basic metabolic panel     CBC with platelets     HEMOGLOBIN A1C          Where to get your medicines      These medications were sent to [facility] [address].  [address] [place] [address]     Phone:  [phone]     cholecalciferol 15271 UNITS capsule    naproxen 500 MG tablet          Primary Care Provider Office Phone # Fax #    Sherrill RubioDO [phone]       [address] [place] [address]        Equal Access to Services     BERLIN GARCIA : Hadii carina warren hadasho Soraviali, waaxda luqadaha, qaybta kaalmada adebob, otilia patrick. So [facility] [phone].    ATENCIÓN: Si habla español, tiene a tubbs disposición servicios gratuitos de asistencia lingüística. Llame al [phone].    We comply with applicable federal civil rights laws and [place] laws. We do not discriminate on the basis of race, color, national origin, age, disability, sex, sexual orientation, or " gender identity.            Thank you!     Thank you for choosing [facility]  for your care. Our goal is always to provide you with excellent care. Hearing back from our patients is one way we can continue to improve our services. Please take a few minutes to complete the written survey that you may receive in the mail after your visit with us. Thank you!             Your Updated Medication List - Protect others around you: Learn how to safely use, store and throw away your medicines at www.disposemymeds.org.          This list is accurate as of: 11/21/17  9:36 AM.  Always use your most recent med list.                   Brand Name Dispense Instructions for use Diagnosis    acetaminophen 325 MG tablet    TYLENOL    100 tablet    Take 2 tablets (650 mg) by mouth 3 times daily as needed for mild pain    Osteoarthritis, unspecified osteoarthritis type, unspecified site       ALOE VERA PO      Take 600 mg by mouth        cholecalciferol 32951 UNITS capsule    VITAMIN D3    10 capsule    Take 1 capsule (50,000 Units) by mouth once a week    Vitamin D deficiency       diclofenac 1 % Gel topical gel    VOLTAREN    100 g    Apply 4 grams to knees or 2 grams to hands four times daily using enclosed dosing card.    Osteoarthritis of knee, unspecified laterality, unspecified osteoarthritis type       ibuprofen 600 MG tablet    ADVIL/MOTRIN    60 tablet    Take 1 tablet (600 mg) by mouth every 6 hours as needed for moderate pain    Osteoarthritis of knee, unspecified laterality, unspecified osteoarthritis type       lidocaine 5 % ointment    XYLOCAINE    30 g    Apply topically as needed for moderate pain Apply to pea side daily prn with pain    Osteoarthritis of knee, unspecified laterality, unspecified osteoarthritis type       naproxen 500 MG tablet    NAPROSYN    60 tablet    Take 1 tablet (500 mg) by mouth 2 times daily as needed for moderate pain    Osteoarthritis, unspecified osteoarthritis type,  unspecified site       omega-3 acid ethyl esters 1 G capsule    Lovaza    120 capsule    Take 2 capsules (2 g) by mouth daily    Elevated cholesterol

## 2017-11-21 NOTE — PROGRESS NOTES
SUBJECTIVE:   Ankush Cole is a 67 year old female who presents to clinic today for the following health issues:      Diabetes Follow-up      Patient is checking blood sugars: 2-3 times a week     Diabetic concerns: None     Symptoms of hypoglycemia (low blood sugar): none     Paresthesias (numbness or burning in feet) or sores: No     Date of last diabetic eye exam:  Feb 2017        Amount of exercise or physical activity: 4-5 days/week for an average of 15-30 minutes    Problems taking medications regularly: No    Medication side effects: none    Diet: regular (no restrictions)    Arthritis is doing ok with alovera        Problem list and histories reviewed & adjusted, as indicated.  Additional history: as documented    Patient Active Problem List   Diagnosis     OA (osteoarthritis) of knee     Disease of lung     Fibroid uterus     Positive QuantiFERON-TB Gold test     Vitamin D deficiency     Type 2 diabetes mellitus without complication (H)     Thyroid nodule     Osteoporosis     Cervical cancer screening     Cataracts, both eyes     Type 2 diabetes mellitus without complication, without long-term current use of insulin (H)     Past Surgical History:   Procedure Laterality Date     NO HISTORY OF SURGERY         Social History   Substance Use Topics     Smoking status: Never Smoker     Smokeless tobacco: Never Used     Alcohol use No     Family History   Problem Relation Age of Onset     DIABETES Daughter      gestational dm     C.A.D. No family hx of      Hypertension No family hx of      CEREBROVASCULAR DISEASE No family hx of      Breast Cancer No family hx of      Cancer - colorectal No family hx of      Glaucoma No family hx of      Macular Degeneration No family hx of              Reviewed and updated as needed this visit by clinical staffTobacco  Allergies  Meds  Med Hx  Surg Hx  Fam Hx  Soc Hx      Reviewed and updated as needed this visit by Provider         ROS:  Constitutional, HEENT,  "cardiovascular, pulmonary, GI, , musculoskeletal, neuro, skin, endocrine and psych systems are negative, except as otherwise noted.      OBJECTIVE:   /70 (BP Location: Right arm, Patient Position: Sitting, Cuff Size: Adult Large)  Pulse 68  Temp 98.2  F (36.8  C) (Oral)  Ht 5' 1\" (1.549 m)  Wt 202 lb 6.4 oz (91.8 kg)  BMI 38.24 kg/m2  Body mass index is 38.24 kg/(m^2).  GENERAL: healthy, alert and no distress  RESP: lungs clear to auscultation - no rales, rhonchi or wheezes  CV: regular rate and rhythm, normal S1 S2, no S3 or S4, no murmur, click or rub, no peripheral edema and peripheral pulses strong  ABDOMEN: soft, nontender, no hepatosplenomegaly, no masses and bowel sounds normal  MS: no gross musculoskeletal defects noted, no edema    Diagnostic Test Results:  Results for orders placed or performed in visit on 11/21/17 (from the past 24 hour(s))   HEMOGLOBIN A1C   Result Value Ref Range    Hemoglobin A1C 6.2 (H) 4.3 - 6.0 %   CBC with platelets   Result Value Ref Range    WBC 7.0 4.0 - 11.0 10e9/L    RBC Count 4.56 3.8 - 5.2 10e12/L    Hemoglobin 13.1 11.7 - 15.7 g/dL    Hematocrit 41.0 35.0 - 47.0 %    MCV 90 78 - 100 fl    MCH 28.7 26.5 - 33.0 pg    MCHC 32.0 31.5 - 36.5 g/dL    RDW 14.1 10.0 - 15.0 %    Platelet Count 231 150 - 450 10e9/L       ASSESSMENT/PLAN:       ICD-10-CM    1. Type 2 diabetes mellitus without complication, without long-term current use of insulin (H) E11.9 Basic metabolic panel     CBC with platelets   2. Vitamin D deficiency E55.9 cholecalciferol (VITAMIN D3) 81831 UNITS capsule   3. Osteoarthritis, unspecified osteoarthritis type, unspecified site M19.90 naproxen (NAPROSYN) 500 MG tablet     Basic metabolic panel   4. Osteoarthritis of knee, unspecified laterality, unspecified osteoarthritis type M17.10 Basic metabolic panel     CBC with platelets     DIABETES MELLITUS -stable, cont diet control  Arthritis- she is doing well without injection with aloe vera " supplements  Vit D supplements-advised restarting vit d, recheck at the next visst    A professional  was used for the whole visit due to language barrier. Patient was given an apportunity to ask questions and I answered all questions to the best of my ability.       See Patient Instructions    Sherrill Rubio DO  [facility]

## 2017-11-21 NOTE — LETTER
[facility]  [address] [place] [address] [phone]                                                                                                November 22, 2017    Ankush Cole  [address]  [facility] [address]        Dear Ms. Cole,    Your recent lab results were within normal limits.     You may contact the clinic at [phone] if you have any questions or concerns about this request.      Sincerely,      Sherrill Rubio, DO/hl    Results for orders placed or performed in visit on 11/21/17   HEMOGLOBIN A1C   Result Value Ref Range    Hemoglobin A1C 6.2 (H) 4.3 - 6.0 %   Albumin Random Urine Quantitative with Creat Ratio   Result Value Ref Range    Creatinine Urine 58 mg/dL    Albumin Urine mg/L <5 mg/L    Albumin Urine mg/g Cr Unable to calculate due to low value 0 - 25 mg/g Cr   Basic metabolic panel   Result Value Ref Range    Sodium 140 133 - 144 mmol/L    Potassium 4.0 3.4 - 5.3 mmol/L    Chloride 106 94 - 109 mmol/L    Carbon Dioxide 27 20 - 32 mmol/L    Anion Gap 7 3 - 14 mmol/L    Glucose 114 (H) 70 - 99 mg/dL    Urea Nitrogen 12 7 - 30 mg/dL    Creatinine 0.68 0.52 - 1.04 mg/dL    GFR Estimate 86 >60 mL/min/1.7m2    GFR Estimate If Black >90 >60 mL/min/1.7m2    Calcium 9.3 8.5 - 10.1 mg/dL   CBC with platelets   Result Value Ref Range    WBC 7.0 4.0 - 11.0 10e9/L    RBC Count 4.56 3.8 - 5.2 10e12/L    Hemoglobin 13.1 11.7 - 15.7 g/dL    Hematocrit 41.0 35.0 - 47.0 %    MCV 90 78 - 100 fl    MCH 28.7 26.5 - 33.0 pg    MCHC 32.0 31.5 - 36.5 g/dL    RDW 14.1 10.0 - 15.0 %    Platelet Count 231 150 - 450 10e9/L

## 2017-11-21 NOTE — NURSING NOTE
"Chief Complaint   Patient presents with     Diabetes       Initial /70 (BP Location: Right arm, Patient Position: Sitting, Cuff Size: Adult Large)  Pulse 68  Temp 98.2  F (36.8  C) (Oral)  Ht 5' 1\" (1.549 m)  Wt 202 lb 6.4 oz (91.8 kg)  BMI 38.24 kg/m2 Estimated body mass index is 38.24 kg/(m^2) as calculated from the following:    Height as of this encounter: 5' 1\" (1.549 m).    Weight as of this encounter: 202 lb 6.4 oz (91.8 kg).  Medication Reconciliation: complete    "

## 2018-02-12 ENCOUNTER — TELEPHONE (OUTPATIENT)
Dept: FAMILY MEDICINE | Facility: CLINIC | Age: 68
End: 2018-02-12

## 2018-02-12 DIAGNOSIS — M17.9 OSTEOARTHRITIS OF KNEE, UNSPECIFIED LATERALITY, UNSPECIFIED OSTEOARTHRITIS TYPE: Primary | ICD-10-CM

## 2018-02-12 NOTE — TELEPHONE ENCOUNTER
Reason for Call:  Other     Detailed comments: Valeria would like to discuss if physical therapy was ever discussed with patient, she feels she may benefit from this.    Phone Number Patient can be reached at: Other phone number:    Miki Shaw [phone]         Best Time:     Can we leave a detailed message on this number? Not Applicable    Call taken on 2/12/2018 at 11:00 AM by Nikole Chiu

## 2018-02-13 NOTE — TELEPHONE ENCOUNTER
Left a detailed VM with Valeria that referral is signed & they will reach out to patient with an  to schedule.  Francisca Diane RN

## 2018-03-01 ENCOUNTER — THERAPY VISIT (OUTPATIENT)
Dept: PHYSICAL THERAPY | Facility: CLINIC | Age: 68
End: 2018-03-01
Payer: COMMERCIAL

## 2018-03-01 DIAGNOSIS — M25.562 BILATERAL KNEE PAIN: Primary | ICD-10-CM

## 2018-03-01 DIAGNOSIS — M25.561 BILATERAL KNEE PAIN: Primary | ICD-10-CM

## 2018-03-01 PROCEDURE — 97161 PT EVAL LOW COMPLEX 20 MIN: CPT | Mod: GP | Performed by: PHYSICAL THERAPIST

## 2018-03-01 PROCEDURE — 97112 NEUROMUSCULAR REEDUCATION: CPT | Mod: GP | Performed by: PHYSICAL THERAPIST

## 2018-03-01 PROCEDURE — 97110 THERAPEUTIC EXERCISES: CPT | Mod: GP | Performed by: PHYSICAL THERAPIST

## 2018-03-01 NOTE — MR AVS SNAPSHOT
"              After Visit Summary   3/1/2018    Ankush Cole    MRN: [id number]           Patient Information     Date Of Birth          1950        Visit Information        Provider Department      3/1/2018 10:35 AM Sonia Malone, PT; [employer] [facility]        Today's Diagnoses     Bilateral knee pain    -  1       Follow-ups after your visit        Who to contact     If you have questions or need follow up information about today's clinic visit or your schedule please contact [facility] directly at [phone].  Normal or non-critical lab and imaging results will be communicated to you by LaunchHearhart, letter or phone within 4 business days after the clinic has received the results. If you do not hear from us within 7 days, please contact the clinic through LaunchHearhart or phone. If you have a critical or abnormal lab result, we will notify you by phone as soon as possible.  Submit refill requests through [employer] Audit or call your pharmacy and they will forward the refill request to us. Please allow 3 business days for your refill to be completed.          Additional Information About Your Visit        MyChart Information     [facility]it lets you send messages to your doctor, view your test results, renew your prescriptions, schedule appointments and more. To sign up, go to [email or web address] . Click on \"Log in\" on the left side of the screen, which will take you to the Welcome page. Then click on \"Sign up Now\" on the right side of the page.     You will be asked to enter the access code listed below, as well as some personal information. Please follow the directions to create your username and password.     Your access code is: [email or web address]  Expires: 2018  1:00 PM     Your access code will  in 90 days. If you need help or a new code, please call your [place] clinic or [phone].        Care EveryWhere " ID     This is your Care EveryWhere ID. This could be used by other organizations to access your [facility] medical records  [id number]         Blood Pressure from Last 3 Encounters:   11/21/17 128/70   03/24/17 134/74   12/30/16 140/72    Weight from Last 3 Encounters:   11/21/17 91.8 kg (202 lb 6.4 oz)   03/24/17 89.9 kg (198 lb 4 oz)   12/30/16 89.4 kg (197 lb)              We Performed the Following     HC PT EVAL, LOW COMPLEXITY     NEUROMUSCULAR RE-EDUCATION     THERAPEUTIC EXERCISES        Primary Care Provider Office Phone # Fax #    Sherrill Rubio [phone]       [address] [place] [address]        Equal Access to Services     DREW GARCIA : Hadrajeev christophero Soravin, waaxda luqadaha, qaybta kaalmada adeegyada, otilia hammond . So [facility] [phone].    ATENCIÓN: Si habla español, tiene a tubbs disposición servicios gratuitos de asistencia lingüística. Llame al [phone].    We comply with applicable federal civil rights laws and [place] laws. We do not discriminate on the basis of race, color, national origin, age, disability, sex, sexual orientation, or gender identity.            Thank you!     Thank you for choosing [facility]  for your care. Our goal is always to provide you with excellent care. Hearing back from our patients is one way we can continue to improve our services. Please take a few minutes to complete the written survey that you may receive in the mail after your visit with us. Thank you!             Your Updated Medication List - Protect others around you: Learn how to safely use, store and throw away your medicines at www.disposemymeds.org.          This list is accurate as of 3/1/18  1:00 PM.  Always use your most recent med list.                   Brand Name Dispense Instructions for use Diagnosis    acetaminophen 325 MG tablet    TYLENOL    100 tablet    Take 2 tablets  (650 mg) by mouth 3 times daily as needed for mild pain    Osteoarthritis, unspecified osteoarthritis type, unspecified site       ALOE VERA PO      Take 600 mg by mouth        cholecalciferol 79857 UNITS capsule    VITAMIN D3    10 capsule    Take 1 capsule (50,000 Units) by mouth once a week    Vitamin D deficiency       diclofenac 1 % Gel topical gel    VOLTAREN    100 g    Apply 4 grams to knees or 2 grams to hands four times daily using enclosed dosing card.    Osteoarthritis of knee, unspecified laterality, unspecified osteoarthritis type       ibuprofen 600 MG tablet    ADVIL/MOTRIN    60 tablet    Take 1 tablet (600 mg) by mouth every 6 hours as needed for moderate pain    Osteoarthritis of knee, unspecified laterality, unspecified osteoarthritis type       lidocaine 5 % ointment    XYLOCAINE    30 g    Apply topically as needed for moderate pain Apply to pea side daily prn with pain    Osteoarthritis of knee, unspecified laterality, unspecified osteoarthritis type       naproxen 500 MG tablet    NAPROSYN    60 tablet    Take 1 tablet (500 mg) by mouth 2 times daily as needed for moderate pain    Osteoarthritis, unspecified osteoarthritis type, unspecified site       omega-3 acid ethyl esters 1 G capsule    Lovaza    120 capsule    Take 2 capsules (2 g) by mouth daily    Elevated cholesterol

## 2018-03-01 NOTE — PROGRESS NOTES
[facility] Initial Evaluation  Subjective:  Patient is a 67 year old female presenting with rehab right knee hpi. The history is provided by the patient. A  was used.   Ankush Cole is a 67 year old female with a bilateral knees condition.  Condition occurred with:  Insidious onset.  Condition occurred: for unknown reasons.  This is a chronic condition  Pain began years ago. PT referral date 2/13/18.    Patient reports pain:  Anterior and medial.  Radiates to:  Thigh and hip.  Pain is described as aching and is intermittent and reported as 8/10 (at worst).  Associated symptoms:  Loss of strength and loss of motion/stiffness. Worse during: no pattern.  Symptoms are exacerbated by ascending stairs, descending stairs, walking, bending/squatting, transfers and sitting and relieved by rest, ice and NSAID's.  Since onset symptoms are gradually worsening.    Previous treatment includes physical therapy and other (cortisone injections).  There was moderate improvement following previous treatment.  General health as reported by patient is fair.  Pertinent medical history includes:  Osteoarthritis.  Medical allergies: no.  Other surgeries include:  None reported.  Current medications:  Anti-inflammatory.  Current occupation is none.    Primary job tasks include:  Prolonged sitting.    Barriers include:  None as reported by the patient.    Red flags:  None as reported by the patient.                        Objective:  System                                           Hip Evaluation  HIP AROM:  AROM:    Left Hip:     Normal    Right Hip:   Normal                    Hip Strength:    Flexion:   Left: 4+/5   Pain:  Right: 4+/5   Pain:                    Extension:  Left: 3+/5  Pain:Right: 4-/5    Pain:    Abduction:  Left: 4-/5     Pain:Right: 4-/5    Pain:  Adduction:  Left: 4+/5    Pain:Right: 4+/5   Pain:  Internal Rotation:  Left: 5/5    Pain:Right: 5/5   Pain:  External Rotation:   Left: 4/5   Pain:    Knee Flexion:  Left: 4 and 4+/5   Pain:Right: 4+/5   Pain:  Knee Extension:  Left: 4+/5   Pain:Right: 4+/5    Pain:                 Knee Evaluation:  ROM:  PROM: not assessed (pt with difficulty relaxing)    AROM      Extension:  Left: 3    Right:  5  Flexion: Left: 120    Right: 120    Pain: with flexion and extension        Special Tests:   Left knee positive for the following special tests:  Danyelle's  Left knee negative for the following special tests:  Patellar compression    Right knee negative for the following special tests:  Patellar Compression and Danyelle's  Palpation:    Left knee tenderness present at:  Medial Joint Line; Lateral Joint Line and Gluteus Medius  Left knee tenderness not present at:  Patellar Medial  Right knee tenderness present at:  Medial Joint Line; Lateral Joint Line and Gluteus Medius  Right knee tenderness not present at:  Patellar Medial  Edema:  Normal    Mobility Testing:      Patellofemoral Medial:  Left: hypomobile    Right: hypomobile  Patellofemoral Lateral:  Left: hypomobile    Right: hypomobile      Functional Testing:          Quad:    Single Leg Squat:  Left:      Right:        Bilateral Leg Squat:   Mild loss of control and excessive anterior knee excursion              General     ROS    Assessment/Plan:    Patient is a 67 year old female with both sides knee complaints.    Patient has the following significant findings with corresponding treatment plan.                Diagnosis 1:  B knee pain  Pain -  hot/cold therapy, manual therapy, self management, education and home program  Decreased ROM/flexibility - manual therapy, therapeutic exercise and home program  Decreased strength - therapeutic exercise, therapeutic activities and home program    Therapy Evaluation Codes:   1) History comprised of:   Personal factors that impact the plan of care:      None.    Comorbidity factors that impact the plan of care are:      Osteoarthritis.     Medications  impacting care: None.  2) Examination of Body Systems comprised of:   Body structures and functions that impact the plan of care:      Hip, Knee and Lumbar spine.   Activity limitations that impact the plan of care are:      Bending, Sitting, Squatting/kneeling, Stairs, Standing and Walking.  3) Clinical presentation characteristics are:   Stable/Uncomplicated.  4) Decision-Making    Low complexity using standardized patient assessment instrument and/or measureable assessment of functional outcome.  Cumulative Therapy Evaluation is: Low complexity.    Previous and current functional limitations:  (See Goal Flow Sheet for this information)    Short term and Long term goals: (See Goal Flow Sheet for this information)     Communication ability:  Patient has an  for communication clarity.  Treatment Explanation - The following has been discussed with the patient:   RX ordered/plan of care  Anticipated outcomes  Possible risks and side effects  This patient would benefit from PT intervention to resume normal activities.   Rehab potential is good.    Frequency:  1 X week, once daily  Duration:  for 8 weeks  Discharge Plan:  Achieve all LTG.  Independent in home treatment program.  Reach maximal therapeutic benefit.    Please refer to the daily flowsheet for treatment today, total treatment time and time spent performing 1:1 timed codes.

## 2018-03-06 ENCOUNTER — TELEPHONE (OUTPATIENT)
Dept: FAMILY MEDICINE | Facility: CLINIC | Age: 68
End: 2018-03-06

## 2018-03-06 NOTE — TELEPHONE ENCOUNTER
Forms received from: [employer].    Phone number listed: [phone]   Fax listed: [phone]  Date received: 3-6-18  Form description: Bath bench request  Once forms are completed, please return to [employer]  via fax.  Is patient requesting to be contacted when forms are completed: n/a  Form placed: Provider folder     Danyelle Cueto

## 2018-03-13 ENCOUNTER — TELEPHONE (OUTPATIENT)
Dept: FAMILY MEDICINE | Facility: CLINIC | Age: 68
End: 2018-03-13

## 2018-03-13 NOTE — TELEPHONE ENCOUNTER
Panel Management Review      Patient has the following on her problem list:     Diabetes    ASA:  No    Last A1C  Lab Results   Component Value Date    A1C 6.2 11/21/2017    A1C 6.2 03/24/2017    A1C 6.5 12/30/2016    A1C 6.7 07/19/2016    A1C 6.3 11/16/2015     A1C tested: Passed    Last LDL:    Lab Results   Component Value Date    CHOL 184 03/24/2017     Lab Results   Component Value Date    HDL 71 03/24/2017     Lab Results   Component Value Date     03/24/2017     Lab Results   Component Value Date    TRIG 54 03/24/2017     Lab Results   Component Value Date    CHOLHDLRATIO 2.2 08/29/2014     Lab Results   Component Value Date    NHDL 113 03/24/2017       Is the patient on a Statin? NO             Is the patient on Aspirin? NO        Last three blood pressure readings:  BP Readings from Last 3 Encounters:   11/21/17 128/70   03/24/17 134/74   12/30/16 140/72       Date of last diabetes office visit: 11/21/17     Tobacco History:     History   Smoking Status     Never Smoker   Smokeless Tobacco     Never Used           Composite cancer screening  Chart review shows that this patient is due/due soon for the following None  Summary:    Patient is due/failing the following:   LDL    Action needed:   Routed to provider for review.     Type of outreach:    None, routed to provider for review.    Questions for provider review:    Fail on LDL- does she need STATIN review?                                                                                                                                      Doreen Boggs MA       Chart routed to Provider .

## 2018-03-13 NOTE — TELEPHONE ENCOUNTER
Mahogany with [facility] calling to check on the status of forms. Advised that form was received and given to provider for completion.

## 2018-03-20 NOTE — TELEPHONE ENCOUNTER
Mahogany with [facility] calling again to request status of forms. She never received a call back from last week's message. It has been 2 weeks since forms were faxed and received on 3/06. Flagging as high priority. Please communicate with Mahogany today regarding status of forms.

## 2018-03-22 NOTE — TELEPHONE ENCOUNTER
Please call back and ask why we need the bench, I need to know the background to the request  Thanks  Sherrill Rubio D.O.

## 2018-03-27 NOTE — TELEPHONE ENCOUNTER
Called [facility] and they did not know why the patient is requesting a bath bench.    They gave the name and number of the patients , Valeria Jeffery, [phone].    Called , Valeria and left a VM message to call me back with why the patient is needing bath bench.    Krupa Tyler

## 2018-03-28 NOTE — TELEPHONE ENCOUNTER
Routing to PCP.  See message below.    Valeria,  from [facility], called back and left a voicemail message that she had visited patient in home and patient gets assistance from daughter when bathing.  Patient is very tired and has pain in hands and feet.  Patient is requesting bath bench so that she can sit down when taking a shower because she gets so tired.  Valeria did see patient in home and she does seem to be having a hard time getting up and walking around.  Valeria said if we have any more questions we can call her back at [phone].    Krupa Tyler

## 2018-04-03 ENCOUNTER — TELEPHONE (OUTPATIENT)
Dept: FAMILY MEDICINE | Facility: CLINIC | Age: 68
End: 2018-04-03

## 2018-04-03 NOTE — TELEPHONE ENCOUNTER
Reason for Call:  Other prescription    Detailed comments: Per [facility] faxed a prescription for approval for patient for bath bench and waiting on pcp approval. [facility] would like to know the status of approval asap. Please advise.     Phone Number Patient can be reached at: Other phone number:  [phone]    Best Time: 8-4:30 (mon-fri)    Can we leave a detailed message on this number? YES    Call taken on 4/3/2018 at 4:30 PM by Marianela Hood

## 2018-04-03 NOTE — TELEPHONE ENCOUNTER
"Route to PCP regarding status of bath bench, as it appears this request was initiated back on 3/6/18 and in reviewing chart, writer does not see that the forms were completed or faxed yet.  Writer did note the form in PCP's \"Pending\" file, and it appears it needs to be filled out.    Niurka Amador RN    "

## 2018-04-04 NOTE — TELEPHONE ENCOUNTER
Mahogany from [facility] is calling to check on status of this prescription. Please send in asap.    Thanks!!  Talib De La O  Patient Representative

## 2018-04-05 NOTE — TELEPHONE ENCOUNTER
Ask Dr Rubio about this form tomorrow.    Dr Rubio is not in clinic today.    Called Mahogany at [facility] and left a VM message that I am waiting on doctor to decide whether she will sign the order for bath bench.  Told Mahogany either I will fax form tomorrow or call her and let her know that PCP will not sign.    Krupa Tyler

## 2018-05-02 ENCOUNTER — OFFICE VISIT (OUTPATIENT)
Dept: FAMILY MEDICINE | Facility: CLINIC | Age: 68
End: 2018-05-02
Payer: COMMERCIAL

## 2018-05-02 VITALS
RESPIRATION RATE: 16 BRPM | BODY MASS INDEX: 39.62 KG/M2 | HEART RATE: 71 BPM | DIASTOLIC BLOOD PRESSURE: 60 MMHG | HEIGHT: 61 IN | SYSTOLIC BLOOD PRESSURE: 128 MMHG | WEIGHT: 209.85 LBS | TEMPERATURE: 97.7 F | OXYGEN SATURATION: 96 %

## 2018-05-02 DIAGNOSIS — Z12.31 VISIT FOR SCREENING MAMMOGRAM: ICD-10-CM

## 2018-05-02 DIAGNOSIS — E55.9 VITAMIN D DEFICIENCY: ICD-10-CM

## 2018-05-02 DIAGNOSIS — M19.90 OSTEOARTHRITIS, UNSPECIFIED OSTEOARTHRITIS TYPE, UNSPECIFIED SITE: ICD-10-CM

## 2018-05-02 DIAGNOSIS — E11.9 TYPE 2 DIABETES MELLITUS WITHOUT COMPLICATION, WITHOUT LONG-TERM CURRENT USE OF INSULIN (H): ICD-10-CM

## 2018-05-02 DIAGNOSIS — M17.9 OSTEOARTHRITIS OF KNEE, UNSPECIFIED LATERALITY, UNSPECIFIED OSTEOARTHRITIS TYPE: ICD-10-CM

## 2018-05-02 DIAGNOSIS — Z00.00 ENCOUNTER FOR ROUTINE ADULT HEALTH EXAMINATION WITHOUT ABNORMAL FINDINGS: Primary | ICD-10-CM

## 2018-05-02 LAB
HBA1C MFR BLD: 6.5 % (ref 0–5.6)
HGB BLD-MCNC: 12.9 G/DL (ref 11.7–15.7)

## 2018-05-02 PROCEDURE — 82043 UR ALBUMIN QUANTITATIVE: CPT | Performed by: FAMILY MEDICINE

## 2018-05-02 PROCEDURE — 83036 HEMOGLOBIN GLYCOSYLATED A1C: CPT | Performed by: FAMILY MEDICINE

## 2018-05-02 PROCEDURE — 99397 PER PM REEVAL EST PAT 65+ YR: CPT | Performed by: FAMILY MEDICINE

## 2018-05-02 PROCEDURE — 85018 HEMOGLOBIN: CPT | Performed by: FAMILY MEDICINE

## 2018-05-02 PROCEDURE — 36415 COLL VENOUS BLD VENIPUNCTURE: CPT | Performed by: FAMILY MEDICINE

## 2018-05-02 PROCEDURE — 80053 COMPREHEN METABOLIC PANEL: CPT | Performed by: FAMILY MEDICINE

## 2018-05-02 RX ORDER — IBUPROFEN 600 MG/1
600 TABLET, FILM COATED ORAL EVERY 6 HOURS PRN
Qty: 60 TABLET | Refills: 11 | Status: SHIPPED | OUTPATIENT
Start: 2018-05-02 | End: 2019-09-04

## 2018-05-02 RX ORDER — ACETAMINOPHEN 325 MG/1
650 TABLET ORAL 3 TIMES DAILY PRN
Qty: 100 TABLET | Refills: 12 | Status: SHIPPED | OUTPATIENT
Start: 2018-05-02 | End: 2020-11-04

## 2018-05-02 RX ORDER — CHOLECALCIFEROL (VITAMIN D3) 50 MCG
2000 TABLET ORAL DAILY
Qty: 100 TABLET | Refills: 3 | Status: SHIPPED | OUTPATIENT
Start: 2018-05-02 | End: 2022-04-20

## 2018-05-02 ASSESSMENT — PAIN SCALES - GENERAL: PAINLEVEL: NO PAIN (0)

## 2018-05-02 NOTE — PATIENT INSTRUCTIONS
Day Care Form completed.     Follow up in clinic in 6 months.     Limit Ibuprofen usage. Tylenol instead is recommended for knee pain.     Urine test and labs today.       New Shingles Vaccine is recommended. Please visit the pharmacy.             Preventive Health Recommendations    Female Ages 65 +    Yearly exam:     See your health care provider every year in order to  o Review health changes.   o Discuss preventive care.    o Review your medicines if your doctor has prescribed any.      You no longer need a yearly Pap test unless you've had an abnormal Pap test in the past 10 years. If you have vaginal symptoms, such as bleeding or discharge, be sure to talk with your provider about a Pap test.      Every 1 to 2 years, have a mammogram.  If you are over 69, talk with your health care provider about whether or not you want to continue having screening mammograms.      Every 10 years, have a colonoscopy. Or, have a yearly FIT test (stool test). These exams will check for colon cancer.       Have a cholesterol test every 5 years, or more often if your doctor advises it.       Have a diabetes test (fasting glucose) every three years. If you are at risk for diabetes, you should have this test more often.       At age 65, have a bone density scan (DEXA) to check for osteoporosis (brittle bone disease).    Shots:    Get a flu shot each year.    Get a tetanus shot every 10 years.    Talk to your doctor about your pneumonia vaccines. There are now two you should receive - Pneumovax (PPSV 23) and Prevnar (PCV 13).    Talk to your doctor about the shingles vaccine.    Talk to your doctor about the hepatitis B vaccine.    Nutrition:     Eat at least 5 servings of fruits and vegetables each day.      Eat whole-grain bread, whole-wheat pasta and brown rice instead of white grains and rice.      Talk to your provider about Calcium and Vitamin D.     Lifestyle    Exercise at least 150 minutes a week (30 minutes a day, 5 days a  week). This will help you control your weight and prevent disease.      Limit alcohol to one drink per day.      No smoking.       Wear sunscreen to prevent skin cancer.       See your dentist twice a year for an exam and cleaning.      See your eye doctor every 1 to 2 years to screen for conditions such as glaucoma, macular degeneration and cataracts.    [facility]   Discharged by : Yumiko Llamas CMA  Paper scripts provided to patient : no     If you have any questions regarding your visit please contact your care team:     Team Gold                Clinic Hours Telephone Number     Dr. Brittany Wheeler, NP 7am-7pm  Monday - Thursday   7am-5pm  Fridays  [phone]   (Appointment scheduling available 24/7)     RN Line  [phone] option 2     [facility]gent [facility] - [place] [facility] - 11am-9pm Monday-Friday Saturday-Sunday- 9am-5pm     Rescue -   5pm-9pm Monday-Friday Saturday-Sunday- 9am-5pm    [phone] - [place]    [phone] - [place]       For a Price Quote for your services, please call our Consumer Price Line at [phone].     What options do I have for visits at the clinic other than the traditional office visit?     To expand how we care for you, many of our providers are utilizing electronic visits (e-visits) and telephone visits, when medically appropriate, for interactions with their patients rather than a visit in the clinic. We also offer nurse visits for many medical concerns. Just like any other service, we will bill your insurance company for this type of visit based on time spent on the phone with your provider. Not all insurance companies cover these visits. Please check with your medical insurance if this type of visit is covered. You will be responsible for any charges that are not paid by your insurance.   E-visits via Oesia: generally incur a $35.00 fee.      Telephone visits:  Time spent on the phone: *charged based on time that is spent on the phone in increments of 10 minutes. Estimated cost:   5-10 mins $30.00   11-20 mins. $59.00   21-30 mins. $85.00       Use Siteminis (secure email communication and access to your chart) to send your primary care provider a message or make an appointment. Ask someone on your Team how to sign up for Siteminis.     As always, Thank you for trusting us with your health care needs!      [facility]:    Scheduling Appointments  [place]  Call: [phone]    Dinh Schuler, [facility]  Call: [phone]    [facility]  Call: [phone]    For Gastroenterology referrals   [facility]   Clinics and Surgery Center, 4th Floor   [address] [place] [address]   Appointments: [phone]    WHERE TO GO FOR CARE?  Clinic    Make an appointment if you:       Are sick (cold, cough, flu, sore throat, earache or in pain).       Have a small injury (sprain, small cut, burn or broken bone).       Need a physical exam, Pap smear, vaccine or prescription refill.       Have questions about your health or medicines.    To reach us:      Call [phone] ([phone]). Open 24 hours every day. (For counseling services, call [phone].)    Log into Siteminis at [email or web address]. (Visit [email or web address] to create an account.) Hospital emergency room    An emergency is a serious or life- threatening problem that must be treated right away.    Call 969 or get to the hospital if you have:      Very bad or sudden:            - Chest pain or pressure         - Bleeding         - Head or belly pain         - Dizziness or trouble seeing, walking or                          Speaking      Problems breathing      Blood in your vomit or you are coughing up blood      A major injury (knocked out, loss of a finger or limb, rape, broken bone  protruding from skin)    A mental health crisis. (Or call the Mental Health Crisis line at 1-780.116.1462 or Suicide Prevention Hotline at 1-590.994.8656.)    Open 24 hours every day. You don't need an appointment.     Urgent care    Visit urgent care for sickness or small injuries when the clinic is closed. You don't need an appointment. To check hours or find an urgent care near you, visit [email or web address]. Online care    Get online care from [facility] for more than 70 common problems, like colds, allergies and infections. Open 24 hours every day at:   [email or web address]   Need help deciding?    For advice about where to be seen, you may call your clinic and ask to speak with a nurse. We're here for you 24 hours every day.         If you are deaf or hard of hearing, please let us know. We provide many free services including sign language interpreters, oral interpreters, TTYs, telephone amplifiers, note takers and written materials.

## 2018-05-02 NOTE — MR AVS SNAPSHOT
After Visit Summary   5/2/2018    Ankush Cole    MRN: [id number]           Patient Information     Date Of Birth          1950        Visit Information        Provider Department      5/2/2018 3:15 PM Sherrill Rubio DO; [employer] [facility]        Today's Diagnoses     Encounter for routine adult health examination without abnormal findings    -  1    Type 2 diabetes mellitus without complication, without long-term current use of insulin (H)        Vitamin D deficiency        Osteoarthritis of knee, unspecified laterality, unspecified osteoarthritis type        Osteoarthritis, unspecified osteoarthritis type, unspecified site        Visit for screening mammogram          Care Instructions    Day Care Form completed.     Follow up in clinic in 6 months.     Limit Ibuprofen usage. Tylenol instead is recommended for knee pain.     Urine test and labs today.       New Shingles Vaccine is recommended. Please visit the pharmacy.             Preventive Health Recommendations    Female Ages 65 +    Yearly exam:     See your health care provider every year in order to  o Review health changes.   o Discuss preventive care.    o Review your medicines if your doctor has prescribed any.      You no longer need a yearly Pap test unless you've had an abnormal Pap test in the past 10 years. If you have vaginal symptoms, such as bleeding or discharge, be sure to talk with your provider about a Pap test.      Every 1 to 2 years, have a mammogram.  If you are over 69, talk with your health care provider about whether or not you want to continue having screening mammograms.      Every 10 years, have a colonoscopy. Or, have a yearly FIT test (stool test). These exams will check for colon cancer.       Have a cholesterol test every 5 years, or more often if your doctor advises it.       Have a diabetes test (fasting glucose) every three years. If you are at risk for  diabetes, you should have this test more often.       At age 65, have a bone density scan (DEXA) to check for osteoporosis (brittle bone disease).    Shots:    Get a flu shot each year.    Get a tetanus shot every 10 years.    Talk to your doctor about your pneumonia vaccines. There are now two you should receive - Pneumovax (PPSV 23) and Prevnar (PCV 13).    Talk to your doctor about the shingles vaccine.    Talk to your doctor about the hepatitis B vaccine.    Nutrition:     Eat at least 5 servings of fruits and vegetables each day.      Eat whole-grain bread, whole-wheat pasta and brown rice instead of white grains and rice.      Talk to your provider about Calcium and Vitamin D.     Lifestyle    Exercise at least 150 minutes a week (30 minutes a day, 5 days a week). This will help you control your weight and prevent disease.      Limit alcohol to one drink per day.      No smoking.       Wear sunscreen to prevent skin cancer.       See your dentist twice a year for an exam and cleaning.      See your eye doctor every 1 to 2 years to screen for conditions such as glaucoma, macular degeneration and cataracts.    [facility]   Discharged by : Yumiko Llamas CMA  Paper scripts provided to patient : no     If you have any questions regarding your visit please contact your care team:     Team Gold                Clinic Hours Telephone Number     Dr. Brittany Wheeler, NP 7am-7pm  Monday - Thursday   7am-5pm  Fridays  [phone]   (Appointment scheduling available 24/7)     RN Line  [phone] option 2     Urgent Care - [facility]bi Hazel and [place] [facility] - 11am-9pm Monday-Friday Saturday-Sunday- 9am-5pm     [place] -   5pm-9pm Monday-Friday Saturday-Sunday- 9am-5pm    [phone] - Shelbi Hazel    [phone] - [place]       For a Price Quote for your services, please call our Consumer Price Line at  [phone].     What options do I have for visits at the clinic other than the traditional office visit?     To expand how we care for you, many of our providers are utilizing electronic visits (e-visits) and telephone visits, when medically appropriate, for interactions with their patients rather than a visit in the clinic. We also offer nurse visits for many medical concerns. Just like any other service, we will bill your insurance company for this type of visit based on time spent on the phone with your provider. Not all insurance companies cover these visits. Please check with your medical insurance if this type of visit is covered. You will be responsible for any charges that are not paid by your insurance.   E-visits via [facility]: generally incur a $35.00 fee.     Telephone visits:  Time spent on the phone: *charged based on time that is spent on the phone in increments of 10 minutes. Estimated cost:   5-10 mins $30.00   11-20 mins. $59.00   21-30 mins. $85.00       Use [facility] (secure email communication and access to your chart) to send your primary care provider a message or make an appointment. Ask someone on your Team how to sign up for [facility].     As always, Thank you for trusting us with your health care needs!      [facility]:    Scheduling Appointments  [place]  Call: [phone]    [facility]  Call: [phone]    [facility]  Call: [phone]    For Gastroenterology referrals   [facility]   Clinics and Surgery Center, 4th Floor   [address] [place] [address]   Appointments: [phone]    WHERE TO GO FOR CARE?  Clinic    Make an appointment if you:       Are sick (cold, cough, flu, sore throat, earache or in pain).       Have a small injury (sprain, small cut, burn or broken bone).       Need a physical exam, Pap smear, vaccine or prescription refill.       Have  questions about your health or medicines.    To reach us:      Call [phone] ([phone]). Open 24 hours every day. (For counseling services, call [phone].)    Log into [employer] at [employer]. (Visit [employer] to create an account.) Hospital emergency room    An emergency is a serious or life- threatening problem that must be treated right away.    Call 911 or get to the hospital if you have:      Very bad or sudden:            - Chest pain or pressure         - Bleeding         - Head or belly pain         - Dizziness or trouble seeing, walking or                          Speaking      Problems breathing      Blood in your vomit or you are coughing up blood      A major injury (knocked out, loss of a finger or limb, rape, broken bone protruding from skin)    A mental health crisis. (Or call the Mental Health Crisis line at 1-779.244.1083 or Suicide Prevention Hotline at 1-529.335.3807.)    Open 24 hours every day. You don't need an appointment.     Urgent care    Visit urgent care for sickness or small injuries when the clinic is closed. You don't need an appointment. To check hours or find an urgent care near you, visit [email or web address]. Online care    Get online care from [facility] for more than 70 common problems, like colds, allergies and infections. Open 24 hours every day at:   [email or web address]   Need help deciding?    For advice about where to be seen, you may call your clinic and ask to speak with a nurse. We're here for you 24 hours every day.         If you are deaf or hard of hearing, please let us know. We provide many free services including sign language interpreters, oral interpreters, TTYs, telephone amplifiers, note takers and written materials.                   Follow-ups after your visit        Future tests that were ordered for you today     Open Future Orders        Priority Expected Expires Ordered    MA Screening Digital Bilateral Routine  5/2/2019 5/2/2018     "        Who to contact     If you have questions or need follow up information about today's clinic visit or your schedule please contact [facility] directly at [phone].  Normal or non-critical lab and imaging results will be communicated to you by MyChart, letter or phone within 4 business days after the clinic has received the results. If you do not hear from us within 7 days, please contact the clinic through MyChart or phone. If you have a critical or abnormal lab result, we will notify you by phone as soon as possible.  Submit refill requests through [employer] or call your pharmacy and they will forward the refill request to us. Please allow 3 business days for your refill to be completed.          Additional Information About Your Visit        [employer] Information     [employer] lets you send messages to your doctor, view your test results, renew your prescriptions, schedule appointments and more. To sign up, go to [email or web address] . Click on \"Log in\" on the left side of the screen, which will take you to the Welcome page. Then click on \"Sign up Now\" on the right side of the page.     You will be asked to enter the access code listed below, as well as some personal information. Please follow the directions to create your username and password.     Your access code is: [email or web address]  Expires: 2018  2:00 PM     Your access code will  in 90 days. If you need help or a new code, please call your [place] clinic or [phone].        Care EveryWhere ID     This is your Care EveryWhere ID. This could be used by other organizations to access your [place] medical records  [id number]        Your Vitals Were     Pulse Temperature Respirations Height Pulse Oximetry BMI (Body Mass Index)    71 97.7  F (36.5  C) (Oral) 16 5' 1\" (1.549 m) 96% 39.65 kg/m2       Blood Pressure from Last 3 Encounters:   18 128/60   17 128/70   17 134/74    Weight from Last 3 Encounters: "   05/02/18 209 lb 13.6 oz (95.2 kg)   11/21/17 202 lb 6.4 oz (91.8 kg)   03/24/17 198 lb 4 oz (89.9 kg)              We Performed the Following     Comprehensive metabolic panel     FOOT EXAM  NO CHARGE [49690.114]     HEMOGLOBIN A1C     Hemoglobin          Today's Medication Changes          These changes are accurate as of 5/2/18  4:22 PM.  If you have any questions, ask your nurse or doctor.               Start taking these medicines.        Dose/Directions    ACE/ARB/ARNI NOT PRESCRIBED (INTENTIONAL)   Used for:  Type 2 diabetes mellitus without complication, without long-term current use of insulin (H)   Started by:  Sherrill Rubio DO        Please choose reason not prescribed, below   Refills:  0       order for DME   Used for:  Type 2 diabetes mellitus without complication, without long-term current use of insulin (H)   Started by:  Sherrill Rubio DO        Lancets.  Daily and prn.   Quantity:  100 each   Refills:  4            Where to get your medicines      These medications were sent to [facility] [address]  [address] [place] [address]     Phone:  [phone]     acetaminophen 325 MG tablet    cholecalciferol 36663 units capsule    ibuprofen 600 MG tablet         Some of these will need a paper prescription and others can be bought over the counter.  Ask your nurse if you have questions.     Bring a paper prescription for each of these medications     order for DME       You don't need a prescription for these medications     ACE/ARB/ARNI NOT PRESCRIBED (INTENTIONAL)                Primary Care Provider Office Phone # Fax #    Sherrill Rubio [phone]       [address] [place] [address]        Equal Access to Services     DREW GARCIA AH: ayan Trejo, otilia madison. So [facility]  [phone].    ATENCIÓN: Si yady webb, tiene a tubbs disposición servicios gratuitos de asistencia lingüística. Rodrigue montesinos [phone].    We comply with applicable federal civil rights laws and [place] laws. We do not discriminate on the basis of race, color, national origin, age, disability, sex, sexual orientation, or gender identity.            Thank you!     Thank you for choosing [facility]  for your care. Our goal is always to provide you with excellent care. Hearing back from our patients is one way we can continue to improve our services. Please take a few minutes to complete the written survey that you may receive in the mail after your visit with us. Thank you!             Your Updated Medication List - Protect others around you: Learn how to safely use, store and throw away your medicines at www.disposemymeds.org.          This list is accurate as of 5/2/18  4:22 PM.  Always use your most recent med list.                   Brand Name Dispense Instructions for use Diagnosis    ACE/ARB/ARNI NOT PRESCRIBED (INTENTIONAL)      Please choose reason not prescribed, below    Type 2 diabetes mellitus without complication, without long-term current use of insulin (H)       acetaminophen 325 MG tablet    TYLENOL    100 tablet    Take 2 tablets (650 mg) by mouth 3 times daily as needed for mild pain    Osteoarthritis, unspecified osteoarthritis type, unspecified site       ALOE VERA PO      Take 600 mg by mouth        cholecalciferol 09545 units capsule    VITAMIN D3    10 capsule    Take 1 capsule (50,000 Units) by mouth once a week    Vitamin D deficiency       ibuprofen 600 MG tablet    ADVIL/MOTRIN    60 tablet    Take 1 tablet (600 mg) by mouth every 6 hours as needed for moderate pain    Osteoarthritis of knee, unspecified laterality, unspecified osteoarthritis type       naproxen 500 MG tablet    NAPROSYN    60 tablet    Take 1 tablet (500 mg) by mouth 2 times daily as needed for  moderate pain    Osteoarthritis, unspecified osteoarthritis type, unspecified site       order for DME     100 each    Lancets.  Daily and prn.    Type 2 diabetes mellitus without complication, without long-term current use of insulin (H)

## 2018-05-02 NOTE — LETTER
May 15, 2018      Ankush Rosadoloresemilia  [address]  [facility] [address]        Dear Ankush,       Your recent labs are normal.  Enclosed is a copy.     Results for orders placed or performed in visit on 05/02/18   HEMOGLOBIN A1C   Result Value Ref Range    Hemoglobin A1C 6.5 (H) 0 - 5.6 %   Comprehensive metabolic panel   Result Value Ref Range    Sodium 139 133 - 144 mmol/L    Potassium 4.2 3.4 - 5.3 mmol/L    Chloride 105 94 - 109 mmol/L    Carbon Dioxide 28 20 - 32 mmol/L    Anion Gap 6 3 - 14 mmol/L    Glucose 104 (H) 70 - 99 mg/dL    Urea Nitrogen 19 7 - 30 mg/dL    Creatinine 0.74 0.52 - 1.04 mg/dL    GFR Estimate 78 >60 mL/min/1.7m2    GFR Estimate If Black >90 >60 mL/min/1.7m2    Calcium 9.3 8.5 - 10.1 mg/dL    Bilirubin Total 0.4 0.2 - 1.3 mg/dL    Albumin 3.9 3.4 - 5.0 g/dL    Protein Total 7.7 6.8 - 8.8 g/dL    Alkaline Phosphatase 61 40 - 150 U/L    ALT 22 0 - 50 U/L    AST 21 0 - 45 U/L   Hemoglobin   Result Value Ref Range    Hemoglobin 12.9 11.7 - 15.7 g/dL   Albumin Random Urine Quantitative with Creat Ratio   Result Value Ref Range    Creatinine Urine 88 mg/dL    Albumin Urine mg/L 8 mg/L    Albumin Urine mg/g Cr 8.54 0 - 25 mg/g Cr           Please call with any questions.       Sincerely,      Sherrill Rubio DO/agustin

## 2018-05-02 NOTE — NURSING NOTE
"Chief Complaint   Patient presents with     Physical     wellness check           Wilbur with KTTS        Initial /60 (BP Location: Right arm, Patient Position: Chair, Cuff Size: Adult Large)  Pulse 71  Temp 97.7  F (36.5  C) (Oral)  Resp 16  Ht 5' 1\" (1.549 m)  Wt 209 lb 13.6 oz (95.2 kg)  SpO2 96%  BMI 39.65 kg/m2 Estimated body mass index is 39.65 kg/(m^2) as calculated from the following:    Height as of this encounter: 5' 1\" (1.549 m).    Weight as of this encounter: 209 lb 13.6 oz (95.2 kg).  Medication Reconciliation: complete   Yumiko Llamas CMA      "

## 2018-05-02 NOTE — PROGRESS NOTES
SUBJECTIVE:   Ankush Cole is a 68 year old female who presents for Preventive Visit.    Patient is accompanied today with an interpretor and daughter.  Patient is not fasting today. Labs reviewed and discussed with patient and daughter.       PAP  Reviewed.      Lab Results   Component Value Date    PAP NIL 09/18/2014     New Shingles Vaccine   Discussed and recommended.      Type 2 Diabetes Mellitus without complication, without long term use of insulin  Lab Results   Component Value Date    A1C 6.5 05/02/2018    A1C 6.2 11/21/2017    A1C 6.2 03/24/2017    A1C 6.5 12/30/2016    A1C 6.7 07/19/2016     Blood glucose is diet controlled only and not taking medication for diabetes. I discussed preventative medications for those diagnosed with diabetes. Patient noted to not have been walking much, and has not been eating well recently.    ,  Vitamin D deficiency   Patient stopped taking vitamin D due to recent trip to [facility] and is planning to restart taking as previous.       Surgical and Medical History   Reviewed and no changes noted.     Chronic Knee Pain  Patient is not using Lidocaine or Diclofenac or Naproxen due to being non effective, but stated to be using ibuprofen for pain and not used daily and tylenol. She also noted to be taking aloe vera.     Form   Day care form completed     Mammogram   Discussed and recommended.      Advanced Directive  Status is Full Code and would like to maintain status.       Are you in the first 12 months of your Medicare Part B coverage?  No    Healthy Habits:    Do you get at least three servings of calcium containing foods daily (dairy, green leafy vegetables, etc.)? yes    Amount of exercise or daily activities, outside of work: 3 day(s) per week    Problems taking medications regularly No    Medication side effects: No    Have you had an eye exam in the past two years? yes    Do you see a dentist twice per year? yes    Do you have sleep apnea, excessive snoring or  daytime drowsiness?snores       Ability to successfully perform activities of daily living: Yes, no assistance needed    Home safety:  none identified     Hearing impairment: No    Fall risk:  Fallen 2 or more times in the past year?: No  Any fall with injury in the past year?: No        COGNITIVE SCREEN  1) Repeat 3 items (Banana, Sunrise, Chair)    2) Clock draw: daughter said unable   3) 3 item recall: Recalls 3 objects  Results: 3 items recalled: COGNITIVE IMPAIRMENT LESS LIKELY    Mini-CogTM Copyright CHANA Grant. Licensed by the author for use in [facility] [employer]; reprinted with permission (ryan@[email or web address]). All rights reserved.                Reviewed and updated as needed this visit by clinical staff         Reviewed and updated as needed this visit by Provider        Social History   Substance Use Topics     Smoking status: Never Smoker     Smokeless tobacco: Never Used     Alcohol use No       If you drink alcohol do you typically have >3 drinks per day or >7 drinks per week? Yes - AUDIT SCORE:     No flowsheet data found.                        Today's PHQ-2 Score:   PHQ-2 ( 1999 Pfizer) 11/21/2017 3/24/2017   Q1: Little interest or pleasure in doing things 0 0   Q2: Feeling down, depressed or hopeless 0 0   PHQ-2 Score 0 0       Do you feel safe in your environment - Yes    Do you have a Health Care Directive?: Yes: Patient states has Advance Directive and will bring in a copy to clinic.    Current providers sharing in care for this patient include:   Patient Care Team:  Sherrill Rubio DO as PCP - General (Family Practice)  Suzette Blanc MD as MD (Student in organized health care education/training program)    The following health maintenance items are reviewed in Epic and correct as of today:  Health Maintenance   Topic Date Due     ADVANCE DIRECTIVE PLANNING Q5 YRS  03/23/2003     FOOT EXAM Q1 YEAR  12/30/2017     EYE EXAM Q1 YEAR  02/08/2018     FALL RISK ASSESSMENT  03/24/2018      LIPID MONITORING Q1 YEAR  03/24/2018     A1C Q6 MO  05/21/2018     MAMMO SCREEN Q2 YR (SYSTEM ASSIGNED)  07/29/2018     INFLUENZA VACCINE (Season Ended) 09/01/2018     TSH W/ FREE T4 REFLEX Q2 YEAR  09/20/2018     CREATININE Q1 YEAR  11/21/2018     MICROALBUMIN Q1 YEAR  11/21/2018     TETANUS IMMUNIZATION (SYSTEM ASSIGNED)  09/18/2024     COLON CANCER SCREEN (SYSTEM ASSIGNED)  03/22/2027     DEXA SCAN SCREENING (SYSTEM ASSIGNED)  Completed     PNEUMOCOCCAL  Completed     HEPATITIS C SCREENING  Completed     Labs reviewed in EPIC  BP Readings from Last 3 Encounters:   11/21/17 128/70   03/24/17 134/74   12/30/16 140/72    Wt Readings from Last 3 Encounters:   05/02/18 95.2 kg (209 lb 13.6 oz)   11/21/17 91.8 kg (202 lb 6.4 oz)   03/24/17 89.9 kg (198 lb 4 oz)                  Patient Active Problem List   Diagnosis     OA (osteoarthritis) of knee     Disease of lung     Fibroid uterus     Positive QuantiFERON-TB Gold test     Vitamin D deficiency     Type 2 diabetes mellitus without complication (H)     Thyroid nodule     Osteoporosis     Cervical cancer screening     Cataracts, both eyes     Type 2 diabetes mellitus without complication, without long-term current use of insulin (H)     Bilateral knee pain     Past Surgical History:   Procedure Laterality Date     NO HISTORY OF SURGERY         Social History   Substance Use Topics     Smoking status: Never Smoker     Smokeless tobacco: Never Used     Alcohol use No     Family History   Problem Relation Age of Onset     DIABETES Daughter      gestational dm     C.A.D. No family hx of      Hypertension No family hx of      CEREBROVASCULAR DISEASE No family hx of      Breast Cancer No family hx of      Cancer - colorectal No family hx of      Glaucoma No family hx of      Macular Degeneration No family hx of          Current Outpatient Prescriptions   Medication Sig Dispense Refill     acetaminophen (TYLENOL) 325 MG tablet Take 2 tablets (650 mg) by mouth 3 times  daily as needed for mild pain 100 tablet 12     ALOE VERA PO Take 600 mg by mouth       cholecalciferol (VITAMIN D3) 75546 UNITS capsule Take 1 capsule (50,000 Units) by mouth once a week 10 capsule 2     diclofenac (VOLTAREN) 1 % GEL topical gel Apply 4 grams to knees or 2 grams to hands four times daily using enclosed dosing card. 100 g 3     ibuprofen (ADVIL/MOTRIN) 600 MG tablet Take 1 tablet (600 mg) by mouth every 6 hours as needed for moderate pain 60 tablet 3     lidocaine (XYLOCAINE) 5 % ointment Apply topically as needed for moderate pain Apply to pea side daily prn with pain 30 g 0     naproxen (NAPROSYN) 500 MG tablet Take 1 tablet (500 mg) by mouth 2 times daily as needed for moderate pain 60 tablet 2     omega-3 acid ethyl esters (LOVAZA) 1 G capsule Take 2 capsules (2 g) by mouth daily 120 capsule 0     No Known Allergies  Recent Labs   Lab Test  11/21/17   0926  11/21/17   0749  03/24/17   1054  12/30/16   1510  09/20/16   1802  07/19/16   1147   04/13/15   0818  01/08/15   1714   08/29/14   1024   A1C   --   6.2*  6.2*  6.5*   --   6.7*   < >  6.6*  6.2*   < >   --    LDL   --    --   102*   --    --   114*   --    --    --    --   86   HDL   --    --   71   --    --   80   --    --    --    --   79   TRIG   --    --   54   --    --   81   --    --    --    --   53   ALT   --    --    --    --    --    --    --   30  17   --   21   CR  0.68   --    --    --    --   0.68   < >  0.62  0.56   --   0.58   GFRESTIMATED  86   --    --    --    --   86   < >  >90  Non  GFR Calc    >90  Non  GFR Calc     --   >90  Non  GFR Calc     GFRESTBLACK  >90   --    --    --    --   >90   GFR Calc     < >  >90   GFR Calc    >90   GFR Calc     --   >90   GFR Calc     POTASSIUM  4.0   --    --    --    --   3.7   < >  4.1  3.7   --   4.0   TSH   --    --    --    --   0.58  0.57   --    --    --    --   0.62  "   < > = values in this interval not displayed.        Pneumonia Vaccine: series completed.   Mammogram Screening: Patient over age 50, mutual decision to screen reflected in health maintenance.    ROS:  Constitutional, HEENT, cardiovascular, pulmonary, GI, , musculoskeletal, neuro, skin, endocrine and psych systems are negative, except as otherwise noted.    This document serves as a record of the services and decisions personally performed and made by Sherrill Rubio D.O. It was created on her behalf by Joe Cabrera, a trained medical scribe. The creation of this document is based on the provider's statements to the medical scribe.  Joe Cabrera 3:46 PM April 17, 2018      OBJECTIVE:   Temp 97.7  F (36.5  C) (Oral)  Ht 5' 1\" (1.549 m)  Wt 209 lb 13.6 oz (95.2 kg)  BMI 39.65 kg/m2 Estimated body mass index is 39.65 kg/(m^2) as calculated from the following:    Height as of this encounter: 5' 1\" (1.549 m).    Weight as of this encounter: 209 lb 13.6 oz (95.2 kg).  EXAM:   GENERAL APPEARANCE: alert, no distress and obese  EYES: Eyes grossly normal to inspection, PERRL and conjunctivae and sclerae normal  HENT: ear canals and TM's normal, nose and mouth without ulcers or lesions, oropharynx clear, oral mucous membranes moist and both ears: minimal erythema in TM, no bulging and good light reflexes   NECK: no adenopathy, no asymmetry, masses, or scars and thyroid normal to palpation  RESP: lungs clear to auscultation - no rales, rhonchi or wheezes  BREAST: normal without masses, tenderness or nipple discharge and no palpable axillary masses or adenopathy  CV: regular rate and rhythm, normal S1 S2, no S3 or S4, no murmur, click or rub, no peripheral edema and peripheral pulses strong  ABDOMEN: soft, nontender, no hepatosplenomegaly, no masses and bowel sounds normal  MS: no musculoskeletal defects are noted and gait is age appropriate without ataxia  SKIN: no suspicious lesions or rashes  NEURO: Normal strength " and tone, sensory exam grossly normal, mentation intact and speech normal  PSYCH: mentation appears normal and affect normal/bright    ASSESSMENT / PLAN:   1. Encounter for routine adult health examination without abnormal findings      2. Type 2 diabetes mellitus without complication, without long-term current use of insulin (H)  Hemoglobin A1C today is 6.5.  Currently, diabetes diagnosis is diet controlled. I recommended taking preventive medications for comorbid diseases with diabetes.   - FOOT EXAM  NO CHARGE [82276.114]  - ACE/ARB/ARNI NOT PRESCRIBED, INTENTIONAL,; Please choose reason not prescribed, below; Refill: 0  - order for DME; Lancets.  Daily and prn.  Dispense: 100 each; Refill: 4    3. Vitamin D deficiency  Patient will restart taking after stop taking during a recent trip to [facility].     - cholecalciferol (VITAMIN D3) 50508 units capsule; Take 1 capsule (50,000 Units) by mouth once a week  Dispense: 10 capsule; Refill: 2    4. Osteoarthritis of knee, unspecified laterality, unspecified osteoarthritis type  Patient will continue to take ibuprofen as directed, but limited use was advised.   - ibuprofen (ADVIL/MOTRIN) 600 MG tablet; Take 1 tablet (600 mg) by mouth every 6 hours as needed for moderate pain  Dispense: 60 tablet; Refill: 11    5. Osteoarthritis, unspecified osteoarthritis type, unspecified site  Patient will continue taking tylenol as directed. Labs today. I will notify patient of results when I receive them.   - acetaminophen (TYLENOL) 325 MG tablet; Take 2 tablets (650 mg) by mouth 3 times daily as needed for mild pain  Dispense: 100 tablet; Refill: 12  - Comprehensive metabolic panel  - Hemoglobin    6. Visit for screening mammogram  - MA Screening Digital Bilateral; Future    End of Life Planning:  Patient currently has an advanced directive: No.  I have verified the patient's ablity to prepare an advanced directive/make health care decisions.  Literature was provided to assist  "patient in preparing an advanced directive.    COUNSELING:  Reviewed preventive health counseling, as reflected in patient instructions       Regular exercise       Healthy diet/nutrition       Vision screening       Hearing screening       Immunizations reviewed.              Osteoporosis Prevention/Bone Health       Advanced Planning     BP Screening:   Last 3 BP Readings:    BP Readings from Last 3 Encounters:   05/02/18 128/60   11/21/17 128/70   03/24/17 134/74       The following was recommended to the patient:  Re-screen BP within a year and recommended lifestyle modifications    Estimated body mass index is 39.65 kg/(m^2) as calculated from the following:    Height as of this encounter: 5' 1\" (1.549 m).    Weight as of this encounter: 209 lb 13.6 oz (95.2 kg).  Weight management plan: Discussed healthy diet and exercise guidelines and patient will follow up in 6 months in clinic to re-evaluate.     reports that she has never smoked. She has never used smokeless tobacco.      Appropriate preventive services were discussed with this patient, including applicable screening as appropriate for cardiovascular disease, diabetes, osteopenia/osteoporosis, and glaucoma.  As appropriate for age/gender, discussed screening for colorectal cancer, prostate cancer, breast cancer, and cervical cancer. Checklist reviewing preventive services available has been given to the patient.    Reviewed patients plan of care and provided an AVS. The Basic Care Plan (routine screening as documented in Health Maintenance) for Ankush meets the Care Plan requirement. This Care Plan has been established and reviewed with the Patient, daughter and interpretor present in the room.    Counseling Resources:  ATP IV Guidelines  Pooled Cohorts Equation Calculator  Breast Cancer Risk Calculator  FRAX Risk Assessment  ICSI Preventive Guidelines  Dietary Guidelines for Americans, 2010  USDA's MyPlate  ASA Prophylaxis  Lung CA Screening    The " information in this document, created by the medical scribe for me, accurately reflects the services I personally performed and the decisions made by me. I have reviewed and approved this document for accuracy prior to leaving the patient care area.  May 2, 2018 4:41 PM      Sherrill Rubio DO  [facility]

## 2018-05-03 LAB
ALBUMIN SERPL-MCNC: 3.9 G/DL (ref 3.4–5)
ALP SERPL-CCNC: 61 U/L (ref 40–150)
ALT SERPL W P-5'-P-CCNC: 22 U/L (ref 0–50)
ANION GAP SERPL CALCULATED.3IONS-SCNC: 6 MMOL/L (ref 3–14)
AST SERPL W P-5'-P-CCNC: 21 U/L (ref 0–45)
BILIRUB SERPL-MCNC: 0.4 MG/DL (ref 0.2–1.3)
BUN SERPL-MCNC: 19 MG/DL (ref 7–30)
CALCIUM SERPL-MCNC: 9.3 MG/DL (ref 8.5–10.1)
CHLORIDE SERPL-SCNC: 105 MMOL/L (ref 94–109)
CO2 SERPL-SCNC: 28 MMOL/L (ref 20–32)
CREAT SERPL-MCNC: 0.74 MG/DL (ref 0.52–1.04)
CREAT UR-MCNC: 88 MG/DL
GFR SERPL CREATININE-BSD FRML MDRD: 78 ML/MIN/1.7M2
GLUCOSE SERPL-MCNC: 104 MG/DL (ref 70–99)
MICROALBUMIN UR-MCNC: 8 MG/L
MICROALBUMIN/CREAT UR: 8.54 MG/G CR (ref 0–25)
POTASSIUM SERPL-SCNC: 4.2 MMOL/L (ref 3.4–5.3)
PROT SERPL-MCNC: 7.7 G/DL (ref 6.8–8.8)
SODIUM SERPL-SCNC: 139 MMOL/L (ref 133–144)

## 2018-06-06 ENCOUNTER — OFFICE VISIT (OUTPATIENT)
Dept: OPTOMETRY | Facility: CLINIC | Age: 68
End: 2018-06-06
Payer: COMMERCIAL

## 2018-06-06 DIAGNOSIS — H25.13 AGE-RELATED NUCLEAR CATARACT OF BOTH EYES: ICD-10-CM

## 2018-06-06 DIAGNOSIS — H52.4 HYPEROPIA OF BOTH EYES WITH ASTIGMATISM AND PRESBYOPIA: ICD-10-CM

## 2018-06-06 DIAGNOSIS — H52.03 HYPEROPIA OF BOTH EYES WITH ASTIGMATISM AND PRESBYOPIA: ICD-10-CM

## 2018-06-06 DIAGNOSIS — E11.9 TYPE 2 DIABETES MELLITUS WITHOUT RETINOPATHY (H): Primary | ICD-10-CM

## 2018-06-06 DIAGNOSIS — H52.203 HYPEROPIA OF BOTH EYES WITH ASTIGMATISM AND PRESBYOPIA: ICD-10-CM

## 2018-06-06 PROCEDURE — 92015 DETERMINE REFRACTIVE STATE: CPT | Performed by: OPTOMETRIST

## 2018-06-06 PROCEDURE — 92014 COMPRE OPH EXAM EST PT 1/>: CPT | Performed by: OPTOMETRIST

## 2018-06-06 ASSESSMENT — VISUAL ACUITY
OD_CC: 20/30
OS_SC: 20/40
OS_CC: 20/25
OD_SC: 20/200
OD_SC: 20/40
CORRECTION_TYPE: GLASSES
OS_SC: 20/100
OS_CC: 20/30
METHOD: NUMBERS - LINEAR
OD_CC: 20/25

## 2018-06-06 ASSESSMENT — TONOMETRY
IOP_METHOD: APPLANATION
OS_IOP_MMHG: 17
OD_IOP_MMHG: 17

## 2018-06-06 ASSESSMENT — CUP TO DISC RATIO
OS_RATIO: 0.3
OD_RATIO: 0.3

## 2018-06-06 ASSESSMENT — REFRACTION_MANIFEST
OS_SPHERE: PLANO
OD_ADD: +2.50
OD_AXIS: 180
OS_ADD: +2.50
OD_CYLINDER: +0.75
OD_SPHERE: +0.50
OS_AXIS: 175
OS_CYLINDER: +0.75

## 2018-06-06 ASSESSMENT — CONF VISUAL FIELD
OS_NORMAL: 1
OD_NORMAL: 1

## 2018-06-06 ASSESSMENT — REFRACTION_WEARINGRX
OD_SPHERE: PLANO
OD_AXIS: 180
OD_CYLINDER: +0.50
OS_AXIS: 174
OS_SPHERE: PLANO
OD_ADD: +2.50
SPECS_TYPE: BIFOCAL
OS_ADD: +2.50
OS_CYLINDER: +0.50

## 2018-06-06 ASSESSMENT — EXTERNAL EXAM - LEFT EYE: OS_EXAM: NORMAL

## 2018-06-06 ASSESSMENT — EXTERNAL EXAM - RIGHT EYE: OD_EXAM: NORMAL

## 2018-06-06 ASSESSMENT — SLIT LAMP EXAM - LIDS
COMMENTS: NORMAL
COMMENTS: NORMAL

## 2018-06-06 NOTE — LETTER
6/6/2018         RE: Ankush Cole  [address]  [facility] [address]        Dear Colleague,    Thank you for referring your patient, Ankush Cole, to the [facility]. Please see a copy of my visit note below.       Chief Complaint   Patient presents with     Eye Exam For Diabetes     Accompanied by   Lab Results   Component Value Date    A1C 6.5 05/02/2018    A1C 6.2 11/21/2017    A1C 6.2 03/24/2017    A1C 6.5 12/30/2016    A1C 6.7 07/19/2016       Last Eye Exam: 1 year ago  Dilated Previously: Yes    What are you currently using to see?  glasses    Distance Vision Acuity: Noticed gradual change in both eyes    Near Vision Acuity: Not satisfied     Eye Comfort: good  Do you use eye drops? : No  Occupation or Hobbies: unemployed    Madeleine Mcnamara, Optometric Tech     Medical, surgical and family histories reviewed and updated 6/6/2018.       OBJECTIVE: See Ophthalmology exam    ASSESSMENT:    ICD-10-CM    1. Type 2 diabetes mellitus without retinopathy (H) E11.9 EYE EXAM (SIMPLE-NONBILLABLE)   2. Age-related nuclear cataract of both eyes H25.13 EYE EXAM (SIMPLE-NONBILLABLE)   3. Hyperopia of both eyes with astigmatism and presbyopia H52.03 EYE EXAM (SIMPLE-NONBILLABLE)    H52.203 REFRACTION    H52.4       PLAN:  Monitor, Keep blood sugar under control  Sent letter to primary care provider regarding diabetes  Monitor cataracts by having yearly exams.  Wear sunglasses when outside.  A final glasses prescription was given, the prescription change is very mild, so no change in necessary.  If you decide to get new glasses, allow time for adaptation.  The glasses may cause dizziness and affect depth perception for awhile.  Return to clinic 1 year for Comprehensive Vision Exam      Lisa Batista O.D  [facility]  [address] [place] [address]. [place]  [place], [place]  [id number] [phone]            Again, thank you for allowing me to participate in the care of  your patient.        Sincerely,        Lisa Batista, OD

## 2018-06-06 NOTE — PROGRESS NOTES
Chief Complaint   Patient presents with     Eye Exam For Diabetes     Accompanied by   Lab Results   Component Value Date    A1C 6.5 05/02/2018    A1C 6.2 11/21/2017    A1C 6.2 03/24/2017    A1C 6.5 12/30/2016    A1C 6.7 07/19/2016       Last Eye Exam: 1 year ago  Dilated Previously: Yes    What are you currently using to see?  glasses    Distance Vision Acuity: Noticed gradual change in both eyes    Near Vision Acuity: Not satisfied     Eye Comfort: good  Do you use eye drops? : No  Occupation or Hobbies: unemployed    Madeleine Mcnamara, [employer]     Medical, surgical and family histories reviewed and updated 6/6/2018.       OBJECTIVE: See Ophthalmology exam    ASSESSMENT:    ICD-10-CM    1. Type 2 diabetes mellitus without retinopathy (H) E11.9 EYE EXAM (SIMPLE-NONBILLABLE)   2. Age-related nuclear cataract of both eyes H25.13 EYE EXAM (SIMPLE-NONBILLABLE)   3. Hyperopia of both eyes with astigmatism and presbyopia H52.03 EYE EXAM (SIMPLE-NONBILLABLE)    H52.203 REFRACTION    H52.4       PLAN:  Monitor, Keep blood sugar under control  Sent letter to primary care provider regarding diabetes  Monitor cataracts by having yearly exams.  Wear sunglasses when outside.  A final glasses prescription was given, the prescription change is very mild, so no change in necessary.  If you decide to get new glasses, allow time for adaptation.  The glasses may cause dizziness and affect depth perception for awhile.  Return to clinic 1 year for Comprehensive Vision Exam      Lisa Batista O.D  [facility]  [address] [place] [address]. [place]  [address]    [phone]

## 2018-06-06 NOTE — PATIENT INSTRUCTIONS
Monitor, Keep blood sugar under control  Sent letter to primary care provider regarding diabetes  Monitor cataracts by having yearly exams.  Wear sunglasses when outside.  A final glasses prescription was given, the prescription change is very mild, so no change in necessary.  If you decide to get new glasses, allow time for adaptation.  The glasses may cause dizziness and affect depth perception for awhile.  Return to clinic 1 year for Comprehensive Vision Exam      Lisa Batista O.D  [address]  [place]  [id number] [phone]

## 2018-06-06 NOTE — MR AVS SNAPSHOT
After Visit Summary   6/6/2018    Ankush Cole    MRN: [id number]           Patient Information     Date Of Birth          1950        Visit Information        Provider Department      6/6/2018 2:30 PM Lisa Batista, OD; [employer] [facility]        Today's Diagnoses     Type 2 diabetes mellitus without retinopathy (H)    -  1    Age-related nuclear cataract of both eyes        Hyperopia of both eyes with astigmatism and presbyopia          Care Instructions        Monitor, Keep blood sugar under control  Sent letter to primary care provider regarding diabetes  Monitor cataracts by having yearly exams.  Wear sunglasses when outside.  A final glasses prescription was given, the prescription change is very mild, so no change in necessary.  If you decide to get new glasses, allow time for adaptation.  The glasses may cause dizziness and affect depth perception for awhile.  Return to clinic 1 year for Comprehensive Vision Exam      Lisa Batista O.D  [address] [place]  [address]    [phone]                Follow-ups after your visit        Follow-up notes from your care team     Return in about 1 year (around 6/6/2019) for Eye Exam.      Who to contact     If you have questions or need follow up information about today's clinic visit or your schedule please contact [facility] directly at [phone].  Normal or non-critical lab and imaging results will be communicated to you by MyChart, letter or phone within 4 business days after the clinic has received the results. If you do not hear from us within 7 days, please contact the clinic through MyChart or phone. If you have a critical or abnormal lab result, we will notify you by phone as soon as possible.  Submit refill requests through [employer] or call your pharmacy and they will forward the refill request to us. Please allow 3 business days for your  refill to be completed.          Additional Information About Your Visit        Care EveryWhere ID     This is your Care EveryWhere ID. This could be used by other organizations to access your [facility] medical records  [id number]         Blood Pressure from Last 3 Encounters:   05/02/18 128/60   11/21/17 128/70   03/24/17 134/74    Weight from Last 3 Encounters:   05/02/18 95.2 kg (209 lb 13.6 oz)   11/21/17 91.8 kg (202 lb 6.4 oz)   03/24/17 89.9 kg (198 lb 4 oz)              We Performed the Following     EYE EXAM (SIMPLE-NONBILLABLE)     REFRACTION        Primary Care Provider Office Phone # Fax #    Sherrill Rubio [phone]       [facility] [facility] [address]        Equal Access to Services     DREW GARCIA : Hadii aad briana hadasho Soravin, waaxda luqadaha, qaybta kaalmada adespenseryatim, otilia hammond . So [facility] [phone].    ATENCIÓN: Si habla español, tiene a tubbs disposición servicios gratuitos de asistencia lingüística. Rodrigue al [phone].    We comply with applicable federal civil rights laws and [place] laws. We do not discriminate on the basis of race, color, national origin, age, disability, sex, sexual orientation, or gender identity.            Thank you!     Thank you for choosing [facility]  for your care. Our goal is always to provide you with excellent care. Hearing back from our patients is one way we can continue to improve our services. Please take a few minutes to complete the written survey that you may receive in the mail after your visit with us. Thank you!             Your Updated Medication List - Protect others around you: Learn how to safely use, store and throw away your medicines at www.disposemymeds.org.          This list is accurate as of 6/6/18  3:46 PM.  Always use your most recent med list.                   Brand Name Dispense Instructions for use Diagnosis    ACE/ARB/ARNI NOT PRESCRIBED  (INTENTIONAL)      Please choose reason not prescribed, below    Type 2 diabetes mellitus without complication, without long-term current use of insulin (H)       acetaminophen 325 MG tablet    TYLENOL    100 tablet    Take 2 tablets (650 mg) by mouth 3 times daily as needed for mild pain    Osteoarthritis, unspecified osteoarthritis type, unspecified site       ALOE VERA PO      Take 600 mg by mouth        * cholecalciferol 71103 units capsule    VITAMIN D3    10 capsule    Take 1 capsule (50,000 Units) by mouth once a week    Vitamin D deficiency       * vitamin D 2000 units tablet     100 tablet    Take 2,000 Units by mouth daily    Vitamin D deficiency       ibuprofen 600 MG tablet    ADVIL/MOTRIN    60 tablet    Take 1 tablet (600 mg) by mouth every 6 hours as needed for moderate pain    Osteoarthritis of knee, unspecified laterality, unspecified osteoarthritis type       naproxen 500 MG tablet    NAPROSYN    60 tablet    Take 1 tablet (500 mg) by mouth 2 times daily as needed for moderate pain    Osteoarthritis, unspecified osteoarthritis type, unspecified site       order for DME     100 each    Lancets.  Daily and prn.    Type 2 diabetes mellitus without complication, without long-term current use of insulin (H)       order for DME     100 each    Test strips for pt's glucometer, brand as covered by insurance. Test daily and prn.    Type 2 diabetes mellitus without complication, without long-term current use of insulin (H)       * Notice:  This list has 2 medication(s) that are the same as other medications prescribed for you. Read the directions carefully, and ask your doctor or other care provider to review them with you.

## 2019-06-17 ENCOUNTER — TELEPHONE (OUTPATIENT)
Dept: FAMILY MEDICINE | Facility: CLINIC | Age: 69
End: 2019-06-17

## 2019-06-17 NOTE — TELEPHONE ENCOUNTER
Forms received from [facility]/ Annual physician Letter for Sherrill Rubio DO.  Forms placed in provider 'sign me' folder.  Please fax forms to [phone] after completion.    Krupa Tyler  Patient Representative

## 2019-06-17 NOTE — TELEPHONE ENCOUNTER
Patient has not been seen in over one year.    Called [facility] and let them know that we cannot complete from until patient is seen.    Krupa Tyler

## 2019-09-04 ENCOUNTER — OFFICE VISIT (OUTPATIENT)
Dept: FAMILY MEDICINE | Facility: CLINIC | Age: 69
End: 2019-09-04
Payer: COMMERCIAL

## 2019-09-04 ENCOUNTER — TELEPHONE (OUTPATIENT)
Dept: FAMILY MEDICINE | Facility: CLINIC | Age: 69
End: 2019-09-04

## 2019-09-04 VITALS
SYSTOLIC BLOOD PRESSURE: 124 MMHG | HEART RATE: 72 BPM | DIASTOLIC BLOOD PRESSURE: 70 MMHG | TEMPERATURE: 98.1 F | BODY MASS INDEX: 37.19 KG/M2 | WEIGHT: 197 LBS | HEIGHT: 61 IN

## 2019-09-04 DIAGNOSIS — L81.9 HYPERPIGMENTATION: ICD-10-CM

## 2019-09-04 DIAGNOSIS — E04.1 THYROID NODULE: ICD-10-CM

## 2019-09-04 DIAGNOSIS — Z12.31 VISIT FOR SCREENING MAMMOGRAM: ICD-10-CM

## 2019-09-04 DIAGNOSIS — E11.9 TYPE 2 DIABETES MELLITUS WITHOUT COMPLICATION, WITHOUT LONG-TERM CURRENT USE OF INSULIN (H): ICD-10-CM

## 2019-09-04 DIAGNOSIS — M19.90 OSTEOARTHRITIS, UNSPECIFIED OSTEOARTHRITIS TYPE, UNSPECIFIED SITE: Primary | ICD-10-CM

## 2019-09-04 DIAGNOSIS — M19.90 OSTEOARTHRITIS, UNSPECIFIED OSTEOARTHRITIS TYPE, UNSPECIFIED SITE: ICD-10-CM

## 2019-09-04 DIAGNOSIS — Z00.00 ENCOUNTER FOR MEDICARE ANNUAL WELLNESS EXAM: ICD-10-CM

## 2019-09-04 DIAGNOSIS — Z00.00 ROUTINE GENERAL MEDICAL EXAMINATION AT A HEALTH CARE FACILITY: Primary | ICD-10-CM

## 2019-09-04 DIAGNOSIS — E66.01 MORBID OBESITY (H): ICD-10-CM

## 2019-09-04 DIAGNOSIS — E55.9 VITAMIN D DEFICIENCY: ICD-10-CM

## 2019-09-04 DIAGNOSIS — M17.9 OSTEOARTHRITIS OF KNEE, UNSPECIFIED LATERALITY, UNSPECIFIED OSTEOARTHRITIS TYPE: ICD-10-CM

## 2019-09-04 LAB — HBA1C MFR BLD: 6.6 % (ref 0–5.6)

## 2019-09-04 PROCEDURE — 36415 COLL VENOUS BLD VENIPUNCTURE: CPT | Performed by: FAMILY MEDICINE

## 2019-09-04 PROCEDURE — 80061 LIPID PANEL: CPT | Performed by: FAMILY MEDICINE

## 2019-09-04 PROCEDURE — 84443 ASSAY THYROID STIM HORMONE: CPT | Performed by: FAMILY MEDICINE

## 2019-09-04 PROCEDURE — 82043 UR ALBUMIN QUANTITATIVE: CPT | Performed by: FAMILY MEDICINE

## 2019-09-04 PROCEDURE — 83036 HEMOGLOBIN GLYCOSYLATED A1C: CPT | Performed by: FAMILY MEDICINE

## 2019-09-04 PROCEDURE — 99214 OFFICE O/P EST MOD 30 MIN: CPT | Mod: 25 | Performed by: FAMILY MEDICINE

## 2019-09-04 PROCEDURE — 80048 BASIC METABOLIC PNL TOTAL CA: CPT | Performed by: FAMILY MEDICINE

## 2019-09-04 PROCEDURE — 99397 PER PM REEVAL EST PAT 65+ YR: CPT | Performed by: FAMILY MEDICINE

## 2019-09-04 RX ORDER — IBUPROFEN 600 MG/1
600 TABLET, FILM COATED ORAL EVERY 6 HOURS PRN
Qty: 60 TABLET | Refills: 11 | Status: SHIPPED | OUTPATIENT
Start: 2019-09-04 | End: 2022-04-20

## 2019-09-04 RX ORDER — CHOLECALCIFEROL (VITAMIN D3) 50 MCG
2000 TABLET ORAL 2 TIMES DAILY
Qty: 180 TABLET | Refills: 11 | Status: SHIPPED | OUTPATIENT
Start: 2019-09-04 | End: 2020-10-23

## 2019-09-04 RX ORDER — LIDOCAINE HYDROCHLORIDE 20 MG/ML
JELLY TOPICAL DAILY
Qty: 30 ML | Refills: 1 | Status: SHIPPED | OUTPATIENT
Start: 2019-09-04

## 2019-09-04 ASSESSMENT — MIFFLIN-ST. JEOR: SCORE: 1345.97

## 2019-09-04 NOTE — TELEPHONE ENCOUNTER
Prior Authorization Retail Medication Request    Medication/Dose: diclofenac 1% gel  ICD code (if different than what is on RX):    Previously Tried and Failed:    Rationale:      Insurance Name:  [employer]  Insurance ID:  [id number]      Pharmacy Information (if different than what is on RX)  Name:  [facility]  Phone:  [phone]    Thank you!  Ashley Nieto  [facility] Float Tech  On behalf of [facility]

## 2019-09-04 NOTE — LETTER
"[facility]  [address] [address] [phone]                                                                                                September 9, 2019    Ankush Cole  [address]  [place] [facility] [address]        Dear Ms. Cole,    Your cholesterol is abnormal, please use the recommendations below and recheck labs in 6-12 months.     Ways to improve your cholesterol...     1- Eats less saturated fats (including avoiding \"trans\" fats).     2 - Eat more unsaturated fats  - found in vegetables, grains, and tree nuts.   Also by replacing butter with canola oil or olive oil.     3 - Eat more nuts.   1-2 ounces (a small handful) of almonds, walnuts, hazelnuts or pecans once a day in place of other less healthy snacks.     4 - Eat more high fiber foods - vegetables and whole grains including oat bran, oats, beans, peas, and flax seed.     5 - Eat more fish - such as salmon, tuna, mackerel, and sardines.  1 or 2 six ounce servings per week is a healthy replacement for other proteins.     6 - Exercise for at least 120 minutes per week - which is equal to 30 minutes 4 days per week.     Sherrill Rubio D.O.       Sincerely,      Sherrill Rubio DO/hl    Results for orders placed or performed in visit on 09/04/19   Lipid panel reflex to direct LDL Fasting   Result Value Ref Range    Cholesterol 219 (H) <200 mg/dL    Triglycerides 86 <150 mg/dL    HDL Cholesterol 75 >49 mg/dL    LDL Cholesterol Calculated 128 (H) <100 mg/dL    Non HDL Cholesterol 144 (H) <130 mg/dL   TSH WITH FREE T4 REFLEX   Result Value Ref Range    TSH 0.49 0.40 - 4.00 mU/L   HEMOGLOBIN A1C   Result Value Ref Range    Hemoglobin A1C 6.6 (H) 0 - 5.6 %   BASIC METABOLIC PANEL   Result Value Ref Range    Sodium 138 133 - 144 mmol/L    Potassium 4.0 3.4 - 5.3 mmol/L    Chloride 105 94 - 109 mmol/L    Carbon Dioxide 27 20 - 32 mmol/L    Anion Gap 6 3 - 14 mmol/L    Glucose 95 70 - 99 " mg/dL    Urea Nitrogen 14 7 - 30 mg/dL    Creatinine 0.69 0.52 - 1.04 mg/dL    GFR Estimate 87 >60 mL/min/[1.73_m2]    GFR Estimate If Black >90 >60 mL/min/[1.73_m2]    Calcium 9.8 8.5 - 10.1 mg/dL   Albumin Random Urine Quantitative with Creat Ratio   Result Value Ref Range    Creatinine Urine 144 mg/dL    Albumin Urine mg/L 12 mg/L    Albumin Urine mg/g Cr 8.12 0 - 25 mg/g Cr

## 2019-09-04 NOTE — PROGRESS NOTES
"  SUBJECTIVE:   Ankush Cole is a 69 year old female who presents for Preventive Visit.  Are you in the first 12 months of your Medicare Part B coverage?  No     Physical Health:    In general, how would you rate your overall physical health? good    Outside of work, how many days during the week do you exercise? 6-7 days/week    Outside of work, approximately how many minutes a day do you exercise?15-30 minutes    If you drink alcohol do you typically have >3 drinks per day or >7 drinks per week? No    Do you usually eat at least 4 servings of fruit and vegetables a day, include whole grains & fiber and avoid regularly eating high fat or \"junk\" foods? Yes    Do you have any problems taking medications regularly?  No    Do you have any side effects from medications? none    Needs assistance for the following daily activities: no assistance needed    Which of the following safety concerns are present in your home?  none identified     Hearing impairment: Yes, no specific concerns    In the past 6 months, have you been bothered by leaking of urine? No    Mental Health:    In general, how would you rate your overall mental or emotional health? good  PHQ-2 Score:      Do you feel safe in your environment? Yes    Do you have a Health Care Directive? No: Advance care planning reviewed with patient; information given to patient to review.    Additional concerns to address?  No    Fall risk:  Fallen 2 or more times in the past year?: No  Any fall with injury in the past year?: No    Cognitive Screening: Not appropriate due to language barrier and need for     Do you have sleep apnea, excessive snoring or daytime drowsiness?: no        Diabetes Follow-up      How often are you checking your blood sugar? Not checking everyday     What time of day are you checking your blood sugars (select all that apply)?  Not consistently checcking    Have you had any blood sugars above 200?  No    Have you had any blood sugars " below 70?  No    What symptoms do you notice when your blood sugar is low?  None    What concerns do you have today about your diabetes? None     Do you have any of these symptoms? (Select all that apply)  No numbness or tingling in feet.  No redness, sores or blisters on feet.  No complaints of excessive thirst.  No reports of blurry vision.  No significant changes to weight.     Have you had a diabetic eye exam in the last 12 months? Last eye Exam 6/6/18, records in [facility], seen at [place]    BP Readings from Last 2 Encounters:   09/04/19 124/70   05/02/18 128/60     Hemoglobin A1C (%)   Date Value   09/04/2019 6.6 (H)   05/02/2018 6.5 (H)     LDL Cholesterol Calculated (mg/dL)   Date Value   09/04/2019 128 (H)   03/24/2017 102 (H)       Diabetes Management Resources    Discussed the benefits and risk factors associated with taking statin medication. Also discussed the possibility of using Asprin in its place.Denies a family history of strokes. Patient reports no bleeding anywhere.      Knee Pain  Patient has bilateral knee pain. She had a steroid injection in the past but was not too certain that it helped. We discussed getting surgery on her knees and increasing activity. Patient uses ibuprofen to address knee pain.      Additional info:  Denies hearing issues. Daughter reports that she has been walking regularly and denies any recent falls. Patient reports recent weight loss which is intentional. Had a thyroid ultrasound in 2016 that showed benign thyroid nodules.   Wt Readings from Last 5 Encounters:   09/04/19 89.4 kg (197 lb)   05/02/18 95.2 kg (209 lb 13.6 oz)   11/21/17 91.8 kg (202 lb 6.4 oz)   03/24/17 89.9 kg (198 lb 4 oz)   12/30/16 89.4 kg (197 lb)         Reviewed and updated as needed this visit by clinical staff   . Tobacco  Allergies  Med Hx  Surg Hx  Fam Hx  Soc Hx         Reviewed and updated as needed this visit by Provider        Social History     Tobacco Use     Smoking status: Never  Smoker     Smokeless tobacco: Never Used   Substance Use Topics     Alcohol use: No                           Current providers sharing in care for this patient include:   Patient Care Team:  Sherrill Rubio DO as PCP - General (Family Practice)  Sherrill Rubio DO as Assigned PCP  Suzette Blanc MD as MD (Student in organized health care education/training program)    The following health maintenance items are reviewed in Epic and correct as of today:  Health Maintenance   Topic Date Due     ADVANCE CARE PLANNING  1950     MAMMO SCREENING  07/29/2018     MEDICARE ANNUAL WELLNESS VISIT  05/02/2019     DIABETIC FOOT EXAM  05/02/2019     FALL RISK ASSESSMENT  05/02/2019     EYE EXAM  06/06/2019     INFLUENZA VACCINE (1) 09/01/2019     HPV  09/18/2019     PAP  09/18/2019     A1C  03/04/2020     BMP  09/04/2020     LIPID  09/04/2020     MICROALBUMIN  09/04/2020     TSH W/FREE T4 REFLEX  09/04/2021     DTAP/TDAP/TD IMMUNIZATION (2 - Td) 09/18/2024     COLONOSCOPY  03/22/2027     DEXA  Completed     HEPATITIS C SCREENING  Completed     PHQ-2  Completed     PNEUMOCOCCAL IMMUNIZATION 65+ LOW/MEDIUM RISK  Completed     ZOSTER IMMUNIZATION  Completed     IPV IMMUNIZATION  Aged Out     MENINGITIS IMMUNIZATION  Aged Out     Labs reviewed in EPIC  Current Outpatient Medications   Medication Sig Dispense Refill     ACE/ARB/ARNI NOT PRESCRIBED, INTENTIONAL, Please choose reason not prescribed, below  0     acetaminophen (TYLENOL) 325 MG tablet Take 2 tablets (650 mg) by mouth 3 times daily as needed for mild pain 100 tablet 12     ALOE VERA PO Take 600 mg by mouth       Cholecalciferol (VITAMIN D) 2000 units tablet Take 2,000 Units by mouth daily 100 tablet 3     diclofenac (VOLTAREN) 1 % topical gel Apply 4 grams to knees or 2 grams to hands four times daily using enclosed dosing card. 100 g 3     ibuprofen (ADVIL/MOTRIN) 600 MG tablet Take 1 tablet (600 mg) by mouth every 6 hours as needed for  moderate pain 60 tablet 11     lidocaine (XYLOCAINE) 2 % external gel Apply topically daily 30 mL 1     naproxen (NAPROSYN) 500 MG tablet Take 1 tablet (500 mg) by mouth 2 times daily as needed for moderate pain 60 tablet 2     order for DME Lancets.  Daily and prn. 100 each 4     order for DME Test strips for pt's glucometer, brand as covered by insurance. Test daily and prn. 100 each 4     STATIN NOT PRESCRIBED (INTENTIONAL) Please choose reason not prescribed, below       vitamin D3 (CHOLECALCIFEROL) 2000 units (50 mcg) tablet Take 1 tablet (2,000 Units) by mouth 2 times daily 180 tablet 11     cholecalciferol (VITAMIN D3) 24253 units capsule Take 1 capsule (50,000 Units) by mouth once a week (Patient not taking: Reported on 9/4/2019) 10 capsule 2     Recent Labs   Lab Test 09/04/19  1612 05/02/18  1524  11/21/17  0749 03/24/17  1054  09/20/16  1802 07/19/16  1147  04/13/15  0818 01/08/15  1714   A1C 6.6* 6.5*  --  6.2* 6.2*   < >  --  6.7*   < > 6.6* 6.2*   *  --   --   --  102*  --   --  114*  --   --   --    HDL 75  --   --   --  71  --   --  80  --   --   --    TRIG 86  --   --   --  54  --   --  81  --   --   --    ALT  --  22  --   --   --   --   --   --   --  30 17   CR 0.69 0.74   < >  --   --   --   --  0.68   < > 0.62 0.56   GFRESTIMATED 87 78   < >  --   --   --   --  86   < > >90  Non  GFR Calc   >90  Non  GFR Calc     GFRESTBLACK >90 >90   < >  --   --   --   --  >90  African American GFR Calc     < > >90   GFR Calc   >90   GFR Calc     POTASSIUM 4.0 4.2   < >  --   --   --   --  3.7   < > 4.1 3.7   TSH 0.49  --   --   --   --   --  0.58 0.57  --   --   --     < > = values in this interval not displayed.      Pneumonia Vaccine:Adults age 65+ who received Pneumovax (PPSV23) at 65 years or older: Should be given PCV13 > 1 year after their most recent PPSV23    ROS:  Constitutional, HEENT, cardiovascular, pulmonary, GI, ,  "musculoskeletal, neuro, skin, endocrine and psych systems are negative, except as otherwise noted.     This document serves as a record of the services and decisions personally performed and made by Sherrill Rubio DO. It was created on her behalf by Tyrell Arzola, a trained medical scribe. The creation of this document is based on the provider's statements to the medical scribe.  Tyrell Arzola 4:46 PM September 4, 2019    OBJECTIVE:   /70   Pulse 72   Temp 98.1  F (36.7  C) (Oral)   Ht 1.549 m (5' 1\")   Wt 89.4 kg (197 lb)   BMI 37.22 kg/m   Estimated body mass index is 37.22 kg/m  as calculated from the following:    Height as of this encounter: 1.549 m (5' 1\").    Weight as of this encounter: 89.4 kg (197 lb).  EXAM:   GENERAL APPEARANCE: healthy, alert and no distress  EYES: Eyes grossly normal to inspection, PERRL and conjunctivae and sclerae normal  HENT: ear canals and TM's normal, nose and mouth without ulcers or lesions, oropharynx clear and oral mucous membranes moist  NECK: no adenopathy, no asymmetry, masses, or scars and thyroid normal to palpation  RESP: lungs clear to auscultation - no rales, rhonchi or wheezes  BREAST: normal without masses, tenderness or nipple discharge and no palpable axillary masses or adenopathy  CV: regular rate and rhythm, normal S1 S2, no S3 or S4, no murmur, click or rub, no peripheral edema and peripheral pulses strong  ABDOMEN: soft, nontender, no hepatosplenomegaly, no masses and bowel sounds normal  MS: no musculoskeletal defects are noted and gait is age appropriate without ataxia  SKIN: hyperpigmentation on the skin on her cheeks, no itching and no other hyperpigmentation anywhere else.   NEURO: Normal strength and tone, sensory exam grossly normal, mentation intact and speech normal  PSYCH: mentation appears normal and affect normal/bright    Diagnostic Test Results:  Labs reviewed in Epic    ASSESSMENT / PLAN:   1. Routine general medical examination " at a health care facility  Exam findings were normal except for skin findings (see above)  - vitamin D3 (CHOLECALCIFEROL) 2000 units (50 mcg) tablet; Take 1 tablet (2,000 Units) by mouth 2 times daily  Dispense: 180 tablet; Refill: 11    2. Type 2 diabetes mellitus without complication, without long-term current use of insulin (H)   Did fasting labs today. Patient has declined the use of statin. Lipids are historically at goal. I recommended daily baby Asprin in place of statin. Diabetes is well controlled with diet and exercise.   - Lipid panel reflex to direct LDL Fasting  - HEMOGLOBIN A1C  - BASIC METABOLIC PANEL  - Albumin Random Urine Quantitative with Creat Ratio  - OPHTHALMOLOGY ADULT REFERRAL  - STATIN NOT PRESCRIBED (INTENTIONAL); Please choose reason not prescribed, below    3. Encounter for Medicare annual wellness exam  See above    4. Vitamin D deficiency  Patient stopped taking vitamin D daily supplements. Last labs, vitamin D levels were low. Recommended restarting vitamin D at 5000 units daily.    5. Visit for screening mammogram  Due as of 1 month ago. Routine screening.   - MA SCREENING DIGITAL BILAT - Future  (s+30); Future    6. Osteoarthritis, unspecified osteoarthritis type, unspecified site  Patient has bilateral knee pain. Has had steroid injection in the past and was unsure if it helped.   - DX Hip/Pelvis/Spine; Future  - ORTHO  REFERRAL  - lidocaine (XYLOCAINE) 2 % external gel; Apply topically daily  Dispense: 30 mL; Refill: 1    7. Thyroid nodule  Has seen specialist/eendo with neg FNA, Noted with thyroid US in 2016. Recheck labs today.   - TSH WITH FREE T4 REFLEX    8. Morbid obesity (H)  Patient has made lifestyle changes and reports a weight loss of 12 pounds in the last 4 months.      9. Osteoarthritis of knee, unspecified laterality, unspecified osteoarthritis type  See above, recommended topical steroid and Advil as needed. Follow-up with ortho for repeat injection if  "topical steroid doesn't provide relief.  - diclofenac (VOLTAREN) 1 % topical gel; Apply 4 grams to knees or 2 grams to hands four times daily using enclosed dosing card.  Dispense: 100 g; Refill: 3  - ibuprofen (ADVIL/MOTRIN) 600 MG tablet; Take 1 tablet (600 mg) by mouth every 6 hours as needed for moderate pain  Dispense: 60 tablet; Refill: 11    10. Hyperpigmentation  Due to recent sun exposure.I recommend using AMBI at night and sunscreen during the day to address hyperpigmentation on you cheeks        End of Life Planning:  Patient currently has an advanced directive: No.  I have verified the patient's ablity to prepare an advanced directive/make health care decisions.  Literature was provided to assist patient in preparing an advanced directive.    COUNSELING:       Regular exercise       Healthy diet/nutrition       Fall risk prevention       Immunizations           Aspirin Prophylaxsis       Osteoporosis Prevention/Bone Health    Estimated body mass index is 37.22 kg/m  as calculated from the following:    Height as of this encounter: 1.549 m (5' 1\").    Weight as of this encounter: 89.4 kg (197 lb).    Weight management plan: Discussed healthy diet and exercise guidelines     reports that she has never smoked. She has never used smokeless tobacco.      Appropriate preventive services were discussed with this patient, including applicable screening as appropriate for cardiovascular disease, diabetes, osteopenia/osteoporosis, and glaucoma.  As appropriate for age/gender, discussed screening for colorectal cancer, prostate cancer, breast cancer, and cervical cancer. Checklist reviewing preventive services available has been given to the patient.    Reviewed patients plan of care and provided an AVS. The Intermediate Care Plan ( asthma action plan, low back pain action plan, and migraine action plan) for Ankush meets the Care Plan requirement. This Care Plan has been established and reviewed with the Patient " and daughter and other:.    Counseling Resources:  ATP IV Guidelines  Pooled Cohorts Equation Calculator  Breast Cancer Risk Calculator  FRAX Risk Assessment  ICSI Preventive Guidelines  Dietary Guidelines for Americans, 2010  USDA's MyPlate  ASA Prophylaxis  Lung CA Screening     The information in this document, created by the medical scribe for me, accurately reflects the services I personally performed and the decisions made by me. I have reviewed and approved this document for accuracy prior to leaving the patient care area.  September 4, 2019 4:48 PM      Sherrill Rubio DO  [facility]

## 2019-09-04 NOTE — PATIENT INSTRUCTIONS
I have placed a referral to get your annual eye exam.    I will put in a referral for you to schedule an appointment to get your mammogram done in the near future.    Restart taking vitamin D, start by taking 5000 units daily. Continue taking calcium.    I would like for you to use baby Asprin if you decide not to go on the cholesterol medication. I would also like for you to continuing implement lifestyle changes such as dieting and exercising.      The results from today's labs and screenings will be made available to you.     Remember to follow up with the pharmacy in late September or early October to get your flu vaccine.    We discussed completing the living will paperwork, please fill this out and send us a copy of the completed forum.      I also placed a referral for the topical cream to help with your knee pain.If knee pain persist I would like you to go to ortho for knee injection    I recommend using AMBI at night and sunscreen during the day to address hyperpigmentation on you cheeks. Ambi can be bought over the counter.     dexa scan  Follow up here in 6 months    Patient Education         Patient Education   Personalized Prevention Plan  You are due for the preventive services outlined below.  Your care team is available to assist you in scheduling these services.  If you have already completed any of these items, please share that information with your care team to update in your medical record.  Health Maintenance Due   Topic Date Due     Discuss Advance Care Planning  1950     Cholesterol Lab  03/24/2018     Mammogram  07/29/2018     Thyroid Function Lab  09/20/2018     A1C Lab  11/02/2018     PHQ-2  01/01/2019     Annual Wellness Visit  05/02/2019     Basic Metabolic Panel  05/02/2019     Kidney Microalbumin Urine Test  05/02/2019     Diabetic Foot Exam  05/02/2019     FALL RISK ASSESSMENT  05/02/2019     Eye Exam  06/06/2019     Flu Vaccine (1) 09/01/2019     HPV Screening  09/18/2019      PAP Smear  09/18/2019       [facility]   Discharged by : Gretta Gregg MA    Paper scripts provided to patient : No     If you have any questions regarding your visit please contact your care team:     Team Gold                Clinic Hours Telephone Number     Dr. Brittany Campbell, CNP 7am-7pm  Monday - Thursday   7am-5pm  Fridays  [phone]   (Appointment scheduling available 24/7)     RN Line  [phone] option 2     Urgent Care - [place] [facility] and [place] - 11am-9pm Monday-Friday Saturday-Sunday- 9am-5pm     [place] -   5pm-9pm Monday-Friday Saturday-Sunday- 9am-5pm    [phone] - [place] [facility]    [phone] - [place]     For a Price Quote for your services, please call our [facility] Price Line at [phone].     What options do I have for visits at the clinic other than the traditional office visit?     To expand how we care for you, many of our providers are utilizing electronic visits (e-visits) and telephone visits, when medically appropriate, for interactions with their patients rather than a visit in the clinic. We also offer nurse visits for many medical concerns. Just like any other service, we will bill your insurance company for this type of visit based on time spent on the phone with your provider. Not all insurance companies cover these visits. Please check with your medical insurance if this type of visit is covered. You will be responsible for any charges that are not paid by your insurance.   E-visits via tweetTV: generally incur a $45.00 fee.     Telephone visits:  Time spent on the phone: *charged based on time that is spent on the phone in increments of 10 minutes. Estimated cost:   5-10 mins $30.00   11-20 mins. $59.00   21-30 mins. $85.00     Use tweetTV (secure email communication and access to your chart) to send your primary care provider a message or make an appointment. Ask someone on your Team how to  sign up for [employer]out[employer].     As always, Thank you for trusting us with your health care needs!    [facility]:    Scheduling Appointments  Kika Rand [facility]  Call: [phone]    Dinh Schuler [facility]  Call: [phone]    [facility]  Call: [phone]    For Gastroenterology referrals   [facility]   Clinics and Surgery Center, 4th Floor   [address] [place] [address]   Appointments: [phone]    WHERE TO GO FOR CARE?  Clinic    Make an appointment if you:       Are sick (cold, cough, flu, sore throat, earache or in pain).       Have a small injury (sprain, small cut, burn or broken bone).       Need a physical exam, Pap smear, vaccine or prescription refill.       Have questions about your health or medicines.    To reach us:      Call [phone] ([phone]). Open 24 hours every day. (For counseling services, call [phone].)    Log into Youtopia at [email or web address]. (Visit [email or web address] to create an account.) Hospital emergency room    An emergency is a serious or life- threatening problem that must be treated right away.    Call 023 or get to the hospital if you have:      Very bad or sudden:            - Chest pain or pressure         - Bleeding         - Head or belly pain         - Dizziness or trouble seeing, walking or                          Speaking      Problems breathing      Blood in your vomit or you are coughing up blood      A major injury (knocked out, loss of a finger or limb, rape, broken bone protruding from skin)    A mental health crisis. (Or call the Mental Health Crisis line at 1-346.965.9977 or Suicide Prevention Hotline at 1-246.670.2013.)    Open 24 hours every day. You don't need an appointment.     Urgent care    Visit urgent care for sickness or small injuries when the clinic is closed. You don't need an appointment. To check hours or find an urgent care  near you, visit [email or web address]. Online care    Get online care from [facility] for more than 70 common problems, like colds, allergies and infections. Open 24 hours every day at:   [email or web address]   Need help deciding?    For advice about where to be seen, you may call your clinic and ask to speak with a nurse. We're here for you 24 hours every day.         If you are deaf or hard of hearing, please let us know. We provide many free services including sign language interpreters, oral interpreters, TTYs, telephone amplifiers, note takers and written materials.

## 2019-09-05 LAB
ANION GAP SERPL CALCULATED.3IONS-SCNC: 6 MMOL/L (ref 3–14)
BUN SERPL-MCNC: 14 MG/DL (ref 7–30)
CALCIUM SERPL-MCNC: 9.8 MG/DL (ref 8.5–10.1)
CHLORIDE SERPL-SCNC: 105 MMOL/L (ref 94–109)
CHOLEST SERPL-MCNC: 219 MG/DL
CO2 SERPL-SCNC: 27 MMOL/L (ref 20–32)
CREAT SERPL-MCNC: 0.69 MG/DL (ref 0.52–1.04)
CREAT UR-MCNC: 144 MG/DL
GFR SERPL CREATININE-BSD FRML MDRD: 87 ML/MIN/{1.73_M2}
GLUCOSE SERPL-MCNC: 95 MG/DL (ref 70–99)
HDLC SERPL-MCNC: 75 MG/DL
LDLC SERPL CALC-MCNC: 128 MG/DL
MICROALBUMIN UR-MCNC: 12 MG/L
MICROALBUMIN/CREAT UR: 8.12 MG/G CR (ref 0–25)
NONHDLC SERPL-MCNC: 144 MG/DL
POTASSIUM SERPL-SCNC: 4 MMOL/L (ref 3.4–5.3)
SODIUM SERPL-SCNC: 138 MMOL/L (ref 133–144)
TRIGL SERPL-MCNC: 86 MG/DL
TSH SERPL DL<=0.005 MIU/L-ACNC: 0.49 MU/L (ref 0.4–4)

## 2019-09-09 NOTE — RESULT ENCOUNTER NOTE
"Your cholesterol is abnormal, please use the recommendations below and recheck labs in 6-12 months.    Ways to improve your cholesterol...    1- Eats less saturated fats (including avoiding \"trans\" fats).    2 - Eat more unsaturated fats  - found in vege  tables, grains, and tree nuts.   Also by replacing butter with canola oil or olive oil.    3 - Eat more nuts.   1-2 ounces (a small handful) of almonds, walnuts, hazelnuts or pecans once a  day in place of other less healthy snacks.    4 - Eat more high   fiber foods - vegetables and whole grains including oat bran, oats, beans, peas, and flax seed.    5 - Eat more fish - such as salmon, tuna, mackerel, and sardines.  1 or 2 six ounce servings per week is a healthy replacement for other proteins.    6 - E  xercise for at least 120 minutes per week - which is equal to 30 minutes 4 days per week.    Sherrill Rubio D.O.    "

## 2019-09-11 ENCOUNTER — ANCILLARY PROCEDURE (OUTPATIENT)
Dept: BONE DENSITY | Facility: CLINIC | Age: 69
End: 2019-09-11
Attending: FAMILY MEDICINE
Payer: COMMERCIAL

## 2019-09-11 ENCOUNTER — ANCILLARY PROCEDURE (OUTPATIENT)
Dept: MAMMOGRAPHY | Facility: CLINIC | Age: 69
End: 2019-09-11
Attending: FAMILY MEDICINE
Payer: COMMERCIAL

## 2019-09-11 DIAGNOSIS — Z12.31 VISIT FOR SCREENING MAMMOGRAM: ICD-10-CM

## 2019-09-11 DIAGNOSIS — M19.90 OSTEOARTHRITIS, UNSPECIFIED OSTEOARTHRITIS TYPE, UNSPECIFIED SITE: ICD-10-CM

## 2019-09-11 PROCEDURE — 77085 DXA BONE DENSITY AXL VRT FX: CPT | Performed by: INTERNAL MEDICINE

## 2019-09-11 PROCEDURE — 77067 SCR MAMMO BI INCL CAD: CPT | Mod: TC

## 2019-09-11 NOTE — TELEPHONE ENCOUNTER
PA Initiation    Medication: diclofenac 1% gel  Insurance Company: [employer] - Phone [phone] Fax [phone]  Pharmacy Filling the Rx: [facility] - [place], [place] - [address] [place] [address].  Filling Pharmacy Phone: [phone]  Filling Pharmacy Fax:    Start Date: 9/11/2019    Central Prior Authorization Team   Phone: [phone]        Awaiting additional questions via CMM

## 2019-09-11 NOTE — LETTER
[facility]  [address] [facility] [address]  [phone]                                                                                                2019    Ankush Cole  [address] [facility] [address]  [facility] [address]        Dear Ms. Cole,      Your bone density test showed mild osteopenia (the precursor to osteoporosis).  Please be sure you are consuming 1200-1500mg per day of calcium either through your diet or with a supplement.  Also, you need 400-800 units of Vitamin D per day for good bone health.  Most calcium supplements contain both Vitamin D and calcium.  The other way to maintain good bone health is to perform daily exercise such as light weight lifting, walking, running or aerobics - exercise that forces your bones to carry your weight. (Exercise such as swimming or biking, while it is healthy for your heart, is not as effective at maintaining bone health.) Please call me or schedule an appointment if you would like more information.     CHARLETTE WernerO/hl    Results for orders placed or performed in visit on 19   DX Hip/Pelvis/Spine w Lat Fraction Dena    Narrative    BONE DENSITOMETRY  [facility]  [address] [place] [id number]  2019      PATIENT: Ankush Cole  CHART: [id number]   :  1950  AGE:  69 year old  SEX:  female   REFERRING PROVIDER:  Sherrill Rubio DO     PROCEDURE:  Bone density scanning was performed using DXA technology of   the lumbar spine and hip.  Scanning was performed on a Lunar Prodigy   scanner.  Reporting is completed in the form of a T-score.  The T-score   represents the standard deviation from peak bone mass based on a young   healthy adult.     REFERENCE T-SCORES:       Normal                -1.0 and greater                                 Osteopenia         Between -1.0 and -2.5                                             Osteoporosis     -2.5 and less          "                              RISK FACTORS:  Post-menopausal, follow up osteoporosis    CURRENT TREATMENT:  Vitamin D     FINDINGS:               Lumbar Spine (L1-L4)      T-score:  -2.1, degenerative   changes present               Left Femoral Neck            T-score:  -1.4               Right Femoral Neck          T-score:  -1.4                         Lumbar (L1-L4) BMD: 0.925       Previous: 0.872                      Total Hip Mean BMD: 0.897     Previous: 0.855    Comparison is made to another DXA performed on a different  Precipio machine   on  7/29/16.     IMPRESSION  Osteopenia., Degenerative changes of the lumbar spine which may falsely   elevate results.    Comparisons from different scanners that have not been cross calibrated,   are not necessarily valid. Such a comparison has been performed here; one   should interpret with caution.   There has been possible significant increase in bone density of the lumbar   spine which may be due to progression of degenerative changes. There has   been probably no significant change in bone density of the hip(s).    Recommendations include ensuring adequate Calcium and Vitamin D.    The current NOF Guidelines recommend treatment for patients with prior hip   or vertebral fracture, T-score -2.5 or below, or 10 year risk of any major   osteoporotic fracture >20% or 10 year risk of hip fracture >3%, as   calculated using the FRAX calculator (www.shef.ac.uk/FRAX or you can   google \"FRAX\").      This patient's risks based on available information, with the use of FRAX,   are 3.9 % for major osteoporotic fracture and 0.5 % for hip fracture.   Based on these guidelines, treatment (in addition to calcium and vitamin   D) may be considered for this patient with low bone density at the spine   in the face of significant degenerative changes, after ruling out other   causes of osteoporosis.  This is meant as an aid to clinical decision making; one must still use   clinical " judgement.      Follow up can be considered in 2 years.   ___________________  Annie Christensen M.D.  Electronically signed

## 2019-09-14 PROBLEM — M25.561 BILATERAL KNEE PAIN: Status: RESOLVED | Noted: 2018-03-01 | Resolved: 2019-09-14

## 2019-09-14 PROBLEM — M25.562 BILATERAL KNEE PAIN: Status: RESOLVED | Noted: 2018-03-01 | Resolved: 2019-09-14

## 2019-09-16 NOTE — TELEPHONE ENCOUNTER
Dr. Rubio, please advice. Would you like to change this medication?  Thank you.  Cira Rdz CMA ([facility])

## 2019-09-16 NOTE — TELEPHONE ENCOUNTER
PRIOR AUTHORIZATION DENIED    Medication: diclofenac 1% gel    Denial Date: 9/14/2019    Denial Rational: Patient must have a history of trial & failure to the formulary alternative(s) or have a contraindication or intolerance to the formulary alternatives: Meloxicam, naproxen, diclofenac SR          Appeal Information:

## 2019-09-20 NOTE — RESULT ENCOUNTER NOTE
Your bone density test showed mild osteopenia (the precursor to osteoporosis).  Please be sure you are consuming 1200-1500mg per day of calcium either through your diet or with a supplement.  Also, you need 400-800 units of Vitamin D per day for good bone health.  Most calcium supplements contain both Vitamin D and calcium.  The other way to maintain good bone health is to perform daily exercise such as light weight lifting, walking, running or aerobics - exercise that forces your bones to carry your weight. (Exercise such as swimming or biking, while it is healthy for your heart, is not as effective at maintaining bone health.) Please call me or schedule an appointment if you would like more information.    CHARLETTE WrenerO

## 2019-10-11 RX ORDER — DICLOFENAC SODIUM 75 MG/1
75 TABLET, DELAYED RELEASE ORAL 2 TIMES DAILY PRN
Qty: 60 TABLET | Refills: 1 | Status: SHIPPED | OUTPATIENT
Start: 2019-10-11 | End: 2020-06-09

## 2019-10-11 NOTE — TELEPHONE ENCOUNTER
Reached out to patient with Chinese .  Patient/family was instructed to return call to [facility], directly on the RN Call back line at [phone].    Niurka Amador RN

## 2019-10-11 NOTE — TELEPHONE ENCOUNTER
Call and let her know to stop ibuprofen and try diclofenac prn as needed.  If not tolerating it then we can do a PA for the topical again  Thanks  Sherrill Rubio D.O.

## 2019-10-15 NOTE — TELEPHONE ENCOUNTER
2nd attempt. Reached out with English .  Patient/family was instructed to return call to [facility], directly on the RN Call back line at [phone].    Niurka Amador RN

## 2019-10-15 NOTE — TELEPHONE ENCOUNTER
Patient's daughter, Jass, verbalized understanding of below message.  Consent to communicate on file.      Geovanny Puga RN

## 2019-11-07 ENCOUNTER — OFFICE VISIT (OUTPATIENT)
Dept: OPTOMETRY | Facility: CLINIC | Age: 69
End: 2019-11-07
Payer: COMMERCIAL

## 2019-11-07 ENCOUNTER — APPOINTMENT (OUTPATIENT)
Dept: OPTOMETRY | Facility: CLINIC | Age: 69
End: 2019-11-07
Payer: COMMERCIAL

## 2019-11-07 DIAGNOSIS — H52.4 PRESBYOPIA: ICD-10-CM

## 2019-11-07 DIAGNOSIS — H52.03 HYPERMETROPIA, BILATERAL: ICD-10-CM

## 2019-11-07 DIAGNOSIS — E11.9 TYPE 2 DIABETES MELLITUS WITHOUT RETINOPATHY (H): ICD-10-CM

## 2019-11-07 DIAGNOSIS — H35.372 EPIRETINAL MEMBRANE (ERM) OF LEFT EYE: ICD-10-CM

## 2019-11-07 DIAGNOSIS — H25.13 NUCLEAR AGE-RELATED CATARACT, BOTH EYES: ICD-10-CM

## 2019-11-07 DIAGNOSIS — H52.223 REGULAR ASTIGMATISM OF BOTH EYES: ICD-10-CM

## 2019-11-07 DIAGNOSIS — Z01.01 ENCOUNTER FOR EXAMINATION OF EYES AND VISION WITH ABNORMAL FINDINGS: Primary | ICD-10-CM

## 2019-11-07 PROCEDURE — 92014 COMPRE OPH EXAM EST PT 1/>: CPT | Performed by: OPTOMETRIST

## 2019-11-07 PROCEDURE — 92015 DETERMINE REFRACTIVE STATE: CPT | Performed by: OPTOMETRIST

## 2019-11-07 PROCEDURE — 92341 FIT SPECTACLES BIFOCAL: CPT | Performed by: OPTOMETRIST

## 2019-11-07 ASSESSMENT — REFRACTION_WEARINGRX
OD_ADD: +2.50
OD_CYLINDER: +0.50
OD_SPHERE: +0.50
OS_AXIS: 002
SPECS_TYPE: BIFOCAL
OS_CYLINDER: +0.50
OD_AXIS: 172
OS_SPHERE: +0.25
OS_ADD: +2.50

## 2019-11-07 ASSESSMENT — SLIT LAMP EXAM - LIDS
COMMENTS: 1+ BLEPHARITIS
COMMENTS: 1+ BLEPHARITIS

## 2019-11-07 ASSESSMENT — EXTERNAL EXAM - RIGHT EYE: OD_EXAM: NORMAL

## 2019-11-07 ASSESSMENT — REFRACTION_MANIFEST
OS_CYLINDER: +0.50
OD_SPHERE: +0.50
OS_AXIS: 168
OS_SPHERE: +0.50
OS_ADD: +2.50
OD_CYLINDER: +1.00
OD_AXIS: 170
OD_ADD: +2.50

## 2019-11-07 ASSESSMENT — VISUAL ACUITY
CORRECTION_TYPE: GLASSES
OS_CC: 20/70
OS_SC: 20/400
METHOD: NUMBERS - BLOCKED
OD_CC: 20/70
OS_CC: 20/70
OD_CC: 20/25
OD_SC: 20/40
OD_SC: 20/400
OS_SC: 20/60

## 2019-11-07 ASSESSMENT — CONF VISUAL FIELD
OD_NORMAL: 1
OS_NORMAL: 1

## 2019-11-07 ASSESSMENT — TONOMETRY
OS_IOP_MMHG: 20
OD_IOP_MMHG: 18
IOP_METHOD: APPLANATION

## 2019-11-07 ASSESSMENT — EXTERNAL EXAM - LEFT EYE: OS_EXAM: NORMAL

## 2019-11-07 ASSESSMENT — CUP TO DISC RATIO
OD_RATIO: 0.25
OS_RATIO: 0.25

## 2019-11-07 NOTE — PATIENT INSTRUCTIONS
You have the start of mild cataracts.  You may notice some blurred vision or glare with night driving.  It is important that you wear good sunglasses to protect your eyes from the ultraviolet light from the sun.     Patient educated on importance of good blood sugar control.  Letter sent to primary care provider with diabetic eye exam report.     You have an epiretinal membrane.  As we grow older, the thick vitreous gel in the middle of our eyes begins to shrink and pull away from the macula. As the vitreous pulls away, scar tissue may develop on the macula. Sometimes the scar tissue can warp and contract, causing the retina to wrinkle or become swollen or distorted.    Olindach was advised of today's exam findings.  Optional to fill new glasses prescription, minimal change  Copy of glasses Rx provided today.  Return in 1 year for eye exam, or sooner if needed.    The effects of the dilating drops last for 4- 6 hours.  You will be more sensitive to light and vision will be blurry up close.  Mydriatic sunglasses were given if needed.      Jakob Pretty O.D.  [address] [place]  [address]    [phone]

## 2019-11-07 NOTE — LETTER
11/7/2019         RE: Ankush Cole  [address]  [facility] [address]        Dear Colleague,    Thank you for referring your patient, Ankush Cole, to the [facility]. Please see a copy of my visit note below.    Chief Complaint   Patient presents with     Annual Eye Exam      Accompanied by   Last Eye Exam: 1 year ago  Dilated Previously: Yes    What are you currently using to see?  Glasses- 1+ years old       Distance Vision Acuity: Noticed gradual change in both eyes    Near Vision Acuity: Not satisfied     Eye Comfort: good  Do you use eye drops? : No  Occupation or Hobbies: unemployed    Madeleine Mcnamara, Optometric Tech          Medical, surgical and family histories reviewed and updated 11/7/2019.       OBJECTIVE: See Ophthalmology exam    ASSESSMENT:    ICD-10-CM    1. Encounter for examination of eyes and vision with abnormal findings Z01.01 EYE EXAM (SIMPLE-NONBILLABLE)   2. Type 2 diabetes mellitus without retinopathy (H) E11.9 EYE EXAM (SIMPLE-NONBILLABLE)   3. Nuclear age-related cataract, both eyes H25.13 EYE EXAM (SIMPLE-NONBILLABLE)   4. Epiretinal membrane (ERM) of left eye H35.372 EYE EXAM (SIMPLE-NONBILLABLE)   5. Hypermetropia, bilateral H52.03 EYE EXAM (SIMPLE-NONBILLABLE)     REFRACTION   6. Regular astigmatism of both eyes H52.223 EYE EXAM (SIMPLE-NONBILLABLE)     REFRACTION   7. Presbyopia H52.4 EYE EXAM (SIMPLE-NONBILLABLE)     REFRACTION      PLAN:     Patient Instructions   You have the start of mild cataracts.  You may notice some blurred vision or glare with night driving.  It is important that you wear good sunglasses to protect your eyes from the ultraviolet light from the sun.     Patient educated on importance of good blood sugar control.  Letter sent to primary care provider with diabetic eye exam report.     You have an epiretinal membrane.  As we grow older, the thick vitreous gel in the middle of our eyes begins to shrink and  pull away from the macula. As the vitreous pulls away, scar tissue may develop on the macula. Sometimes the scar tissue can warp and contract, causing the retina to wrinkle or become swollen or distorted.    nAkush was advised of today's exam findings.  Optional to fill new glasses prescription, minimal change  Copy of glasses Rx provided today.  Return in 1 year for eye exam, or sooner if needed.    The effects of the dilating drops last for 4- 6 hours.  You will be more sensitive to light and vision will be blurry up close.  Mydriatic sunglasses were given if needed.      Jakob Pretty O.D.  [address] [place]  [address] [phone]           Again, thank you for allowing me to participate in the care of your patient.        Sincerely,        Jakob Pretty OD

## 2019-11-07 NOTE — PROGRESS NOTES
Chief Complaint   Patient presents with     Annual Eye Exam      Accompanied by   Last Eye Exam: 1 year ago  Dilated Previously: Yes    What are you currently using to see?  Glasses- 1+ years old       Distance Vision Acuity: Noticed gradual change in both eyes    Near Vision Acuity: Not satisfied     Eye Comfort: good  Do you use eye drops? : No  Occupation or Hobbies: unemployed    Madeleine Mcnamara, [employer] Tech          Medical, surgical and family histories reviewed and updated 11/7/2019.       OBJECTIVE: See Ophthalmology exam    ASSESSMENT:    ICD-10-CM    1. Encounter for examination of eyes and vision with abnormal findings Z01.01 EYE EXAM (SIMPLE-NONBILLABLE)   2. Type 2 diabetes mellitus without retinopathy (H) E11.9 EYE EXAM (SIMPLE-NONBILLABLE)   3. Nuclear age-related cataract, both eyes H25.13 EYE EXAM (SIMPLE-NONBILLABLE)   4. Epiretinal membrane (ERM) of left eye H35.372 EYE EXAM (SIMPLE-NONBILLABLE)   5. Hypermetropia, bilateral H52.03 EYE EXAM (SIMPLE-NONBILLABLE)     REFRACTION   6. Regular astigmatism of both eyes H52.223 EYE EXAM (SIMPLE-NONBILLABLE)     REFRACTION   7. Presbyopia H52.4 EYE EXAM (SIMPLE-NONBILLABLE)     REFRACTION      PLAN:     Patient Instructions   You have the start of mild cataracts.  You may notice some blurred vision or glare with night driving.  It is important that you wear good sunglasses to protect your eyes from the ultraviolet light from the sun.     Patient educated on importance of good blood sugar control.  Letter sent to primary care provider with diabetic eye exam report.     You have an epiretinal membrane.  As we grow older, the thick vitreous gel in the middle of our eyes begins to shrink and pull away from the macula. As the vitreous pulls away, scar tissue may develop on the macula. Sometimes the scar tissue can warp and contract, causing the retina to wrinkle or become swollen or distorted.    Ankush was advised of today's exam  findings.  Optional to fill new glasses prescription, minimal change  Copy of glasses Rx provided today.  Return in 1 year for eye exam, or sooner if needed.    The effects of the dilating drops last for 4- 6 hours.  You will be more sensitive to light and vision will be blurry up close.  Mydriatic sunglasses were given if needed.      Jakob Pretty O.D.  [facility]  [address] [place] [address]. [place]  [place]  [address]    [phone]

## 2020-01-29 DIAGNOSIS — E11.9 TYPE 2 DIABETES MELLITUS WITHOUT COMPLICATION, WITHOUT LONG-TERM CURRENT USE OF INSULIN (H): Primary | ICD-10-CM

## 2020-01-29 NOTE — TELEPHONE ENCOUNTER
"ONETOUCH ULTRA BLUE  STRP  Last Written Prescription Date:  5/2/2018  Last Fill Quantity: 100,  # refills: 4   Last office visit: 9/4/2019 with prescribing provider:  minh   Future Office Visit:    Requested Prescriptions   Pending Prescriptions Disp Refills     ONETOUCH ULTRA test strip [Pharmacy Med Name: ONETOUCH ULTRA BLUE  STRP] 100 each 4     Sig: USE TO TEST DAILY AND/OR AS NEEDED       Diabetic Supplies Protocol Failed - 1/29/2020 11:43 AM        Failed - Medication is active on med list        Passed - Patient is 18 years of age or older        Passed - Recent (6 mo) or future (30 days) visit within the authorizing provider's specialty     Patient had office visit in the last 6 months or has a visit in the next 30 days with authorizing provider.  See \"Patient Info\" tab in inbasket, or \"Choose Columns\" in Meds & Orders section of the refill encounter.              "

## 2020-01-30 ENCOUNTER — TELEPHONE (OUTPATIENT)
Dept: FAMILY MEDICINE | Facility: CLINIC | Age: 70
End: 2020-01-30

## 2020-01-30 DIAGNOSIS — E11.9 TYPE 2 DIABETES MELLITUS WITHOUT COMPLICATION, WITHOUT LONG-TERM CURRENT USE OF INSULIN (H): ICD-10-CM

## 2020-01-30 NOTE — TELEPHONE ENCOUNTER
Faxed refill request from pharmacy asking for OneTouch Delica lancets 33G misc, but I do not see this medication in patient's list of current or non-current medications.

## 2020-01-31 NOTE — TELEPHONE ENCOUNTER
Routing refill request to provider for review/approval because:  Test strips not on med list. Last time DME order for test strips was in 2016.     Angela Peralta RN   [facility]

## 2020-02-24 ENCOUNTER — TELEPHONE (OUTPATIENT)
Dept: FAMILY MEDICINE | Facility: CLINIC | Age: 70
End: 2020-02-24

## 2020-02-24 NOTE — TELEPHONE ENCOUNTER
Panel Management Review      Patient has the following on her problem list:     Diabetes    ASA: Failed    Last A1C  Lab Results   Component Value Date    A1C 6.6 09/04/2019    A1C 6.5 05/02/2018    A1C 6.2 11/21/2017    A1C 6.2 03/24/2017    A1C 6.5 12/30/2016     A1C tested: Passed    Last LDL:    Lab Results   Component Value Date    CHOL 219 09/04/2019     Lab Results   Component Value Date    HDL 75 09/04/2019     Lab Results   Component Value Date     09/04/2019     Lab Results   Component Value Date    TRIG 86 09/04/2019     Lab Results   Component Value Date    CHOLHDLRATIO 2.2 08/29/2014     Lab Results   Component Value Date    NHDL 144 09/04/2019       Is the patient on a Statin? NO             Is the patient on Aspirin? NO    Medications     HMG CoA Reductase Inhibitors     STATIN NOT PRESCRIBED (INTENTIONAL)             Last three blood pressure readings:  BP Readings from Last 3 Encounters:   09/04/19 124/70   05/02/18 128/60   11/21/17 128/70       Date of last diabetes office visit: 9/4/2019     Tobacco History:     History   Smoking Status     Never Smoker   Smokeless Tobacco     Never Used           Composite cancer screening  Chart review shows that this patient is due/due soon for the following None  Summary:    Patient is due/failing the following:   A1C    Action needed:   Patient needs office visit for Chronic Disease Follow up .    Type of outreach:    Sent letter.    Questions for provider review:    None                                                                                                                                    Gretta Gregg MA       Chart routed to Care Team .

## 2020-02-24 NOTE — LETTER
[facility]  [address] [facility] [address] [phone]                                                                                                February 24, 2020    Ankush Cole  [address] [facility] [address]  [place] [address]        Dear Ms. Cole,    At [facility] we care about your health and well-being. A review of your chart has indicated that you are due for a diabetic check. Please contact us at [phone] to schedule your appointment.    If you have already had one or all of the above screening tests at another facility, please call us to update your chart.     You may contact the clinic at [phone] if you have any questions or concerns about this request.      Sincerely,      Your Care Team

## 2020-03-02 NOTE — TELEPHONE ENCOUNTER
Panel Management Review  Summary:    Type of outreach:    Phone, left message for patient to call back.     Encounter routed to No Action Needed.                                                                                                                               Gretta Gregg MA

## 2020-06-05 DIAGNOSIS — M19.90 OSTEOARTHRITIS, UNSPECIFIED OSTEOARTHRITIS TYPE, UNSPECIFIED SITE: ICD-10-CM

## 2020-06-08 NOTE — TELEPHONE ENCOUNTER
Routing refill request to provider for review/approval because:  Labs not current or out of range.    Pending Prescriptions:                       Disp   Refills    diclofenac (VOLTAREN) 75 MG EC tablet [Pha*60 tab*1        Sig: TAKE ONE TABLET BY MOUTH TWICE A DAY AS NEEDED FOR           MODERATE PAIN

## 2020-06-09 RX ORDER — DICLOFENAC SODIUM 75 MG/1
TABLET, DELAYED RELEASE ORAL
Qty: 60 TABLET | Refills: 1 | Status: SHIPPED | OUTPATIENT
Start: 2020-06-09 | End: 2020-11-04

## 2020-09-01 ENCOUNTER — TELEPHONE (OUTPATIENT)
Dept: FAMILY MEDICINE | Facility: CLINIC | Age: 70
End: 2020-09-01

## 2020-10-23 DIAGNOSIS — M19.90 OSTEOARTHRITIS, UNSPECIFIED OSTEOARTHRITIS TYPE, UNSPECIFIED SITE: ICD-10-CM

## 2020-10-23 DIAGNOSIS — Z00.00 ROUTINE GENERAL MEDICAL EXAMINATION AT A HEALTH CARE FACILITY: ICD-10-CM

## 2020-10-23 RX ORDER — CHOLECALCIFEROL (VITAMIN D3) 50 MCG
50 TABLET ORAL 2 TIMES DAILY
Qty: 60 TABLET | Refills: 0 | Status: SHIPPED | OUTPATIENT
Start: 2020-10-23 | End: 2020-11-04

## 2020-10-23 RX ORDER — LIDOCAINE HYDROCHLORIDE 20 MG/ML
JELLY TOPICAL DAILY
Qty: 30 ML | Refills: 1 | Status: CANCELLED | OUTPATIENT
Start: 2020-10-23

## 2020-10-23 NOTE — TELEPHONE ENCOUNTER
Reason for Call:  Medication or medication refill:    Do you use a [facility]?  Name of the pharmacy and phone number for the current request:       [facility] [place] - [address] [place] [address].      Name of the medication requested: Vitamin D , lidocaine gel, glucose meter      Other request: Patient's daughter states they requested these medications last week and they still do not have them at the pharmacy. Please call once prescriptions have been sent/ or with any questions.       Can we leave a detailed message on this number? YES    Phone number patient can be reached at: Home number on file [phone] (home)    Best Time: any     Call taken on 10/23/2020 at 2:29 PM by Jacqueline Trevizo

## 2020-10-23 NOTE — TELEPHONE ENCOUNTER
Attempt #2 left message to make appointment    Patient needs an office visit for medication refill per PCP.    Dayanara Lentz RN

## 2020-10-23 NOTE — TELEPHONE ENCOUNTER
Patient's daughter called back to schedule an appointment, made physical for 11/4/20.    New glucometer and vit D sent to chelsea Lentz RN

## 2020-11-04 ENCOUNTER — OFFICE VISIT (OUTPATIENT)
Dept: FAMILY MEDICINE | Facility: CLINIC | Age: 70
End: 2020-11-04
Payer: COMMERCIAL

## 2020-11-04 VITALS
SYSTOLIC BLOOD PRESSURE: 126 MMHG | HEIGHT: 61 IN | WEIGHT: 192 LBS | OXYGEN SATURATION: 97 % | DIASTOLIC BLOOD PRESSURE: 72 MMHG | BODY MASS INDEX: 36.25 KG/M2 | HEART RATE: 72 BPM

## 2020-11-04 DIAGNOSIS — M85.80 OSTEOPENIA, UNSPECIFIED LOCATION: ICD-10-CM

## 2020-11-04 DIAGNOSIS — Z01.84 IMMUNITY STATUS TESTING: ICD-10-CM

## 2020-11-04 DIAGNOSIS — Z00.00 ROUTINE GENERAL MEDICAL EXAMINATION AT A HEALTH CARE FACILITY: Primary | ICD-10-CM

## 2020-11-04 DIAGNOSIS — M21.41 FLAT FEET: ICD-10-CM

## 2020-11-04 DIAGNOSIS — Z13.220 SCREENING FOR HYPERLIPIDEMIA: ICD-10-CM

## 2020-11-04 DIAGNOSIS — E66.01 MORBID OBESITY (H): ICD-10-CM

## 2020-11-04 DIAGNOSIS — M19.90 OSTEOARTHRITIS, UNSPECIFIED OSTEOARTHRITIS TYPE, UNSPECIFIED SITE: ICD-10-CM

## 2020-11-04 DIAGNOSIS — M21.42 FLAT FEET: ICD-10-CM

## 2020-11-04 DIAGNOSIS — M79.672 PAIN IN BOTH FEET: ICD-10-CM

## 2020-11-04 DIAGNOSIS — M79.671 PAIN IN BOTH FEET: ICD-10-CM

## 2020-11-04 DIAGNOSIS — E11.9 TYPE 2 DIABETES MELLITUS WITHOUT COMPLICATION, WITHOUT LONG-TERM CURRENT USE OF INSULIN (H): ICD-10-CM

## 2020-11-04 DIAGNOSIS — Z00.00 ENCOUNTER FOR MEDICARE ANNUAL WELLNESS EXAM: ICD-10-CM

## 2020-11-04 LAB
BASOPHILS # BLD AUTO: 0 10E9/L (ref 0–0.2)
BASOPHILS NFR BLD AUTO: 0.2 %
DIFFERENTIAL METHOD BLD: NORMAL
EOSINOPHIL # BLD AUTO: 0.1 10E9/L (ref 0–0.7)
EOSINOPHIL NFR BLD AUTO: 1.6 %
ERYTHROCYTE [DISTWIDTH] IN BLOOD BY AUTOMATED COUNT: 13.6 % (ref 10–15)
HBA1C MFR BLD: 6.7 % (ref 0–5.6)
HCT VFR BLD AUTO: 41.6 % (ref 35–47)
HGB BLD-MCNC: 13.4 G/DL (ref 11.7–15.7)
LYMPHOCYTES # BLD AUTO: 2.5 10E9/L (ref 0.8–5.3)
LYMPHOCYTES NFR BLD AUTO: 28.8 %
MCH RBC QN AUTO: 29.2 PG (ref 26.5–33)
MCHC RBC AUTO-ENTMCNC: 32.2 G/DL (ref 31.5–36.5)
MCV RBC AUTO: 91 FL (ref 78–100)
MONOCYTES # BLD AUTO: 0.7 10E9/L (ref 0–1.3)
MONOCYTES NFR BLD AUTO: 8.3 %
NEUTROPHILS # BLD AUTO: 5.2 10E9/L (ref 1.6–8.3)
NEUTROPHILS NFR BLD AUTO: 61.1 %
PLATELET # BLD AUTO: 295 10E9/L (ref 150–450)
RBC # BLD AUTO: 4.59 10E12/L (ref 3.8–5.2)
WBC # BLD AUTO: 8.6 10E9/L (ref 4–11)

## 2020-11-04 PROCEDURE — 82306 VITAMIN D 25 HYDROXY: CPT | Performed by: FAMILY MEDICINE

## 2020-11-04 PROCEDURE — 90471 IMMUNIZATION ADMIN: CPT | Performed by: FAMILY MEDICINE

## 2020-11-04 PROCEDURE — 90662 IIV NO PRSV INCREASED AG IM: CPT | Performed by: FAMILY MEDICINE

## 2020-11-04 PROCEDURE — 99213 OFFICE O/P EST LOW 20 MIN: CPT | Mod: 25 | Performed by: FAMILY MEDICINE

## 2020-11-04 PROCEDURE — 83036 HEMOGLOBIN GLYCOSYLATED A1C: CPT | Performed by: FAMILY MEDICINE

## 2020-11-04 PROCEDURE — 85025 COMPLETE CBC W/AUTO DIFF WBC: CPT | Performed by: FAMILY MEDICINE

## 2020-11-04 PROCEDURE — 82043 UR ALBUMIN QUANTITATIVE: CPT | Performed by: FAMILY MEDICINE

## 2020-11-04 PROCEDURE — 87340 HEPATITIS B SURFACE AG IA: CPT | Performed by: FAMILY MEDICINE

## 2020-11-04 PROCEDURE — 86706 HEP B SURFACE ANTIBODY: CPT | Performed by: FAMILY MEDICINE

## 2020-11-04 PROCEDURE — 86708 HEPATITIS A ANTIBODY: CPT | Performed by: FAMILY MEDICINE

## 2020-11-04 PROCEDURE — 36415 COLL VENOUS BLD VENIPUNCTURE: CPT | Performed by: FAMILY MEDICINE

## 2020-11-04 PROCEDURE — 99397 PER PM REEVAL EST PAT 65+ YR: CPT | Mod: 25 | Performed by: FAMILY MEDICINE

## 2020-11-04 RX ORDER — NAPROXEN 500 MG/1
500 TABLET ORAL 2 TIMES DAILY PRN
Qty: 60 TABLET | Refills: 2 | Status: SHIPPED | OUTPATIENT
Start: 2020-11-04 | End: 2022-04-20

## 2020-11-04 RX ORDER — DICLOFENAC SODIUM 75 MG/1
TABLET, DELAYED RELEASE ORAL
Qty: 60 TABLET | Refills: 3 | Status: SHIPPED | OUTPATIENT
Start: 2020-11-04 | End: 2021-01-20

## 2020-11-04 RX ORDER — ACETAMINOPHEN 325 MG/1
650 TABLET ORAL 3 TIMES DAILY PRN
Qty: 100 TABLET | Refills: 12 | Status: SHIPPED | OUTPATIENT
Start: 2020-11-04 | End: 2024-06-24

## 2020-11-04 RX ORDER — CHOLECALCIFEROL (VITAMIN D3) 50 MCG
50 TABLET ORAL 2 TIMES DAILY
Qty: 60 TABLET | Refills: 0 | Status: SHIPPED | OUTPATIENT
Start: 2020-11-04 | End: 2021-02-02

## 2020-11-04 ASSESSMENT — MIFFLIN-ST. JEOR: SCORE: 1318.29

## 2020-11-04 NOTE — PATIENT INSTRUCTIONS
Flu shot  Continue all meds    Patient Education   Personalized Prevention Plan  You are due for the preventive services outlined below.  Your care team is available to assist you in scheduling these services.  If you have already completed any of these items, please share that information with your care team to update in your medical record.  Health Maintenance Due   Topic Date Due     Discuss Advance Care Planning  1950     Hepatitis B Vaccine (1 of 3 - Risk 3-dose series) 03/23/1967     Diabetic Foot Exam  05/02/2019     PHQ-2  01/01/2020     A1C Lab  03/04/2020     Flu Vaccine (1) 09/01/2020     Basic Metabolic Panel  09/04/2020     Cholesterol Lab  09/04/2020     Kidney Microalbumin Urine Test  09/04/2020     FALL RISK ASSESSMENT  09/04/2020     Eye Exam  11/07/2020

## 2020-11-04 NOTE — PROGRESS NOTES
"  SUBJECTIVE:   Ankush Cole is a 70 year old female who presents for Preventive Visit.      Patient has been advised of split billing requirements and indicates understanding: Yes  Are you in the first 12 months of your Medicare Part B coverage?  No    Physical Health:    In general, how would you rate your overall physical health? good    Outside of work, how many days during the week do you exercise? 6-7 days/week    Outside of work, approximately how many minutes a day do you exercise?15-30 minutes    If you drink alcohol do you typically have >3 drinks per day or >7 drinks per week? No    Do you usually eat at least 4 servings of fruit and vegetables a day, include whole grains & fiber and avoid regularly eating high fat or \"junk\" foods? Yes    Do you have any problems taking medications regularly?  No    Do you have any side effects from medications? none    Needs assistance for the following daily activities: language barrier and  assistance    Which of the following safety concerns are present in your home?  none identified     Hearing impairment: No    In the past 6 months, have you been bothered by leaking of urine? no    Mental Health:    In general, how would you rate your overall mental or emotional health? good  PHQ-2 Score:  0    Do you feel safe in your environment? Yes    Have you ever done Advance Care Planning? (For example, a Health Directive, POLST, or a discussion with a medical provider or your loved ones about your wishes): No, advance care planning information given to patient to review.  Patient plans to discuss their wishes with loved ones or provider.      Additional concerns to address?      Fall risk:  Fallen 2 or more times in the past year?: No  Any fall with injury in the past year?: No    Cognitive Screening: Not appropriate due to language barrier    Do you have sleep apnea, excessive snoring or daytime drowsiness?: no        Diabetes Follow-up      How often are " you checking your blood sugar? Not at all    What concerns do you have today about your diabetes? None     Do you have any of these symptoms? (Select all that apply)  No numbness or tingling in feet.  No redness, sores or blisters on feet.  No complaints of excessive thirst.  No reports of blurry vision.  No significant changes to weight.    Have you had a diabetic eye exam in the last 12 months? No  Last completed 11/7/2019      Foot feet at times in the am , flat feet, no swelling, no trauma, no issues with hs of gout    Osteopenia-dexa stable, she is now more active, increasing her activity,     History of arthritis and pain, not had any injections in one year, she has been taking jean vera and also using creams, she wants to me to refill it    Not sure about shots          BP Readings from Last 2 Encounters:   11/04/20 126/72   09/04/19 124/70     Hemoglobin A1C (%)   Date Value   11/04/2020 6.7 (H)   09/04/2019 6.6 (H)     LDL Cholesterol Calculated (mg/dL)   Date Value   09/04/2019 128 (H)   03/24/2017 102 (H)             Reviewed and updated as needed this visit by clinical staff                 Reviewed and updated as needed this visit by Provider                Social History     Tobacco Use     Smoking status: Never Smoker     Smokeless tobacco: Never Used   Substance Use Topics     Alcohol use: No                           Current providers sharing in care for this patient include:   Patient Care Team:  Sherrill Rubio DO as PCP - General (Family Practice)  Sherrill Rubio DO as Assigned PCP  Suzette Blanc MD as MD (Student in organized health care education/training program)    The following health maintenance items are reviewed in Epic and correct as of today:  Health Maintenance   Topic Date Due     HEPATITIS B IMMUNIZATION (1 of 3 - Risk 3-dose series) 03/23/1969     DIABETIC FOOT EXAM  05/02/2019     BMP  09/04/2020     LIPID  09/04/2020     EYE EXAM  11/07/2020     A1C   "05/04/2021     MAMMO SCREENING  09/11/2021     MEDICARE ANNUAL WELLNESS VISIT  11/04/2021     MICROALBUMIN  11/04/2021     FALL RISK ASSESSMENT  11/04/2021     DTAP/TDAP/TD IMMUNIZATION (2 - Td) 09/18/2024     ADVANCE CARE PLANNING  11/04/2025     COLORECTAL CANCER SCREENING  03/22/2027     DEXA  Completed     HEPATITIS C SCREENING  Completed     PHQ-2  Completed     INFLUENZA VACCINE  Completed     Pneumococcal Vaccine: 65+ Years  Completed     ZOSTER IMMUNIZATION  Completed     Pneumococcal Vaccine: Pediatrics (0 to 5 Years) and At-Risk Patients (6 to 64 Years)  Aged Out     IPV IMMUNIZATION  Aged Out     MENINGITIS IMMUNIZATION  Aged Out     Labs reviewed in EPIC  Pneumonia Vaccine:Adults age 65+ who received Pneumovax (PPSV23) at 65 years or older: Should be given PCV13 > 1 year after their most recent PPSV23  Mammogram Screening: Mammogram Screening: Patient over age 50, mutual decision to screen reflected in health maintenance.    ROS:  Constitutional, HEENT, cardiovascular, pulmonary, GI, , musculoskeletal, neuro, skin, endocrine and psych systems are negative, except as otherwise noted.    OBJECTIVE:   /72   Pulse 72   Ht 1.549 m (5' 1\")   Wt 87.1 kg (192 lb)   SpO2 97%   BMI 36.28 kg/m   Estimated body mass index is 36.28 kg/m  as calculated from the following:    Height as of this encounter: 1.549 m (5' 1\").    Weight as of this encounter: 87.1 kg (192 lb).  EXAM:   GENERAL APPEARANCE: healthy, alert and no distress  EYES: Eyes grossly normal to inspection, PERRL and conjunctivae and sclerae normal  HENT: ear canals and TM's normal, nose and mouth without ulcers or lesions, oropharynx clear and oral mucous membranes moist  NECK: no adenopathy, no asymmetry, masses, or scars and thyroid normal to palpation  RESP: lungs clear to auscultation - no rales, rhonchi or wheezes  BREAST: normal without masses, tenderness or nipple discharge and no palpable axillary masses or adenopathy  CV: regular " rate and rhythm, normal S1 S2, no S3 or S4, no murmur, click or rub, no peripheral edema and peripheral pulses strong  ABDOMEN: soft, nontender, no hepatosplenomegaly, no masses and bowel sounds normal  MS: no musculoskeletal defects are noted and gait is age appropriate without ataxia  SKIN: no suspicious lesions or rashes  NEURO: Normal strength and tone, sensory exam grossly normal, mentation intact and speech normal  PSYCH: mentation appears normal and affect normal/bright    Diagnostic Test Results:  Labs reviewed in Epic    ASSESSMENT / PLAN:       ICD-10-CM    1. Routine general medical examination at a health care facility  Z00.00 vitamin D3 (CHOLECALCIFEROL) 50 mcg (2000 units) tablet   2. Screening for hyperlipidemia  Z13.220    3. Type 2 diabetes mellitus without complication, without long-term current use of insulin (H)  E11.9 HEMOGLOBIN A1C     Albumin Random Urine Quantitative with Creat Ratio     CBC with platelets and differential   4. Encounter for Medicare annual wellness exam  Z00.00 FLUZONE HIGH DOSE 65+  [79122]     CBC with platelets and differential   5. Immunity status testing  Z01.84 Hepatitis A Antibody IgG     Hepatitis B surface antigen     Hepatitis B Surface Antibody   6. Osteoarthritis, unspecified osteoarthritis type, unspecified site  M19.90 naproxen (NAPROSYN) 500 MG tablet     diclofenac (VOLTAREN) 75 MG EC tablet     acetaminophen (TYLENOL) 325 MG tablet   7. Morbid obesity (H)  E66.01    8. Osteopenia, unspecified location  M85.80 Vitamin D Deficiency   9. Pain in both feet  M79.671     M79.672    10. Flat feet  M21.41     M21.42      DIABETES MELLITUS-stable , not on any meds, diet controlled, walking and watching her diet    Arthritis/chronic knee pain, takes nsaid sparingly, uses creams which has helped.     Feet pain/flat  Feet-sounds like plantar fasciitis stretching, wear comfortable shoes, follow up  If not  Resolving    Osteopenia-vit D, exercise    Obesity-as  "above    Immunity status-hep a/b status checked      Patient has been advised of split billing requirements and indicates understanding: Yes    COUNSELING:  Reviewed preventive health counseling, as reflected in patient instructions    Estimated body mass index is 36.28 kg/m  as calculated from the following:    Height as of this encounter: 1.549 m (5' 1\").    Weight as of this encounter: 87.1 kg (192 lb).    Weight management plan: Discussed healthy diet and exercise guidelines    She reports that she has never smoked. She has never used smokeless tobacco.    Appropriate preventive services were discussed with this patient, including applicable screening as appropriate for cardiovascular disease, diabetes, osteopenia/osteoporosis, and glaucoma.  As appropriate for age/gender, discussed screening for colorectal cancer, prostate cancer, breast cancer, and cervical cancer. Checklist reviewing preventive services available has been given to the patient.    Reviewed patients plan of care and provided an AVS. The Intermediate Care Plan ( asthma action plan, low back pain action plan, and migraine action plan) for Ankush meets the Care Plan requirement. This Care Plan has been established and reviewed with the Patient and daughter.    Counseling Resources:  ATP IV Guidelines  Pooled Cohorts Equation Calculator  Breast Cancer Risk Calculator  BRCA-Related Cancer Risk Assessment: FHS-7 Tool  FRAX Risk Assessment  ICSI Preventive Guidelines  Dietary Guidelines for Americans, 2010  USDA's MyPlate  ASA Prophylaxis  Lung CA Screening    DO ROSITA Castellon [facility]  "

## 2020-11-04 NOTE — LETTER
[facility]  [address] [address] [phone]                                                                                                November 6, 2020    Ankush Cole  [address]  [place] [facility] [address]        Dear Ms. Cole,    Your hep B is not immune.  Please follow up for shots, otherwise normal labs.    Take care       Sherrill Rubio DO/hl    Results for orders placed or performed in visit on 11/04/20   HEMOGLOBIN A1C     Status: Abnormal   Result Value Ref Range    Hemoglobin A1C 6.7 (H) 0 - 5.6 %   Albumin Random Urine Quantitative with Creat Ratio     Status: None   Result Value Ref Range    Creatinine Urine 204 mg/dL    Albumin Urine mg/L 18 mg/L    Albumin Urine mg/g Cr 8.97 0 - 25 mg/g Cr   CBC with platelets and differential     Status: None   Result Value Ref Range    WBC 8.6 4.0 - 11.0 10e9/L    RBC Count 4.59 3.8 - 5.2 10e12/L    Hemoglobin 13.4 11.7 - 15.7 g/dL    Hematocrit 41.6 35.0 - 47.0 %    MCV 91 78 - 100 fl    MCH 29.2 26.5 - 33.0 pg    MCHC 32.2 31.5 - 36.5 g/dL    RDW 13.6 10.0 - 15.0 %    Platelet Count 295 150 - 450 10e9/L    % Neutrophils 61.1 %    % Lymphocytes 28.8 %    % Monocytes 8.3 %    % Eosinophils 1.6 %    % Basophils 0.2 %    Absolute Neutrophil 5.2 1.6 - 8.3 10e9/L    Absolute Lymphocytes 2.5 0.8 - 5.3 10e9/L    Absolute Monocytes 0.7 0.0 - 1.3 10e9/L    Absolute Eosinophils 0.1 0.0 - 0.7 10e9/L    Absolute Basophils 0.0 0.0 - 0.2 10e9/L    Diff Method Automated Method    Vitamin D Deficiency     Status: None   Result Value Ref Range    Vitamin D Deficiency screening 36 20 - 75 ug/L   Hepatitis A Antibody IgG     Status: Abnormal   Result Value Ref Range    Hepatitis A Antibody IgG Reactive (AA) NR^Nonreactive   Hepatitis B surface antigen     Status: None   Result Value Ref Range    Hep B Surface Agn Nonreactive NR^Nonreactive   Hepatitis B Surface Antibody     Status: None   Result  Value Ref Range    Hepatitis B Surface Antibody 0.74 <8.00 m[IU]/mL

## 2020-11-05 LAB
CREAT UR-MCNC: 204 MG/DL
DEPRECATED CALCIDIOL+CALCIFEROL SERPL-MC: 36 UG/L (ref 20–75)
HAV IGG SER QL IA: REACTIVE
HBV SURFACE AB SERPL IA-ACNC: 0.74 M[IU]/ML
HBV SURFACE AG SERPL QL IA: NONREACTIVE
MICROALBUMIN UR-MCNC: 18 MG/L
MICROALBUMIN/CREAT UR: 8.97 MG/G CR (ref 0–25)

## 2020-11-06 NOTE — RESULT ENCOUNTER NOTE
Your hep B is not immune,  Please follow up for shots, otherwise normal labs. Normal labs otherwise  Take care  Sherrill Rubio D.O

## 2021-01-08 DIAGNOSIS — M19.90 OSTEOARTHRITIS, UNSPECIFIED OSTEOARTHRITIS TYPE, UNSPECIFIED SITE: ICD-10-CM

## 2021-01-12 NOTE — TELEPHONE ENCOUNTER
Routing refill request to provider for review/approval because:    NSAID Medications [id number]/08/2021 04:47 PM   Normal ALT on file in past 12 months Protocol Details    Normal AST on file in past 12 months     Patient is age 6-64 years     Normal serum creatinine on file in past 12 months

## 2021-01-20 RX ORDER — DICLOFENAC SODIUM 75 MG/1
TABLET, DELAYED RELEASE ORAL
Qty: 60 TABLET | Refills: 3 | Status: SHIPPED | OUTPATIENT
Start: 2021-01-20 | End: 2022-04-20

## 2021-02-08 ENCOUNTER — TELEPHONE (OUTPATIENT)
Dept: FAMILY MEDICINE | Facility: CLINIC | Age: 71
End: 2021-02-08

## 2021-02-08 NOTE — TELEPHONE ENCOUNTER
Forms received from [employer] 30'-39' [employer] for Sherrill Rubio DO.  Forms placed in provider 'sign me' folder.  Please fax forms to [phone] after completion.    SHAZIA CRUZ (R)  Radiology Department

## 2021-06-24 ENCOUNTER — OFFICE VISIT (OUTPATIENT)
Dept: OPTOMETRY | Facility: CLINIC | Age: 71
End: 2021-06-24
Payer: COMMERCIAL

## 2021-06-24 DIAGNOSIS — H25.13 NUCLEAR AGE-RELATED CATARACT, BOTH EYES: ICD-10-CM

## 2021-06-24 DIAGNOSIS — Z01.01 ENCOUNTER FOR EXAMINATION OF EYES AND VISION WITH ABNORMAL FINDINGS: Primary | ICD-10-CM

## 2021-06-24 DIAGNOSIS — E11.9 TYPE 2 DIABETES MELLITUS WITHOUT RETINOPATHY (H): ICD-10-CM

## 2021-06-24 DIAGNOSIS — H52.4 PRESBYOPIA: ICD-10-CM

## 2021-06-24 DIAGNOSIS — H52.223 REGULAR ASTIGMATISM OF BOTH EYES: ICD-10-CM

## 2021-06-24 DIAGNOSIS — H35.372 EPIRETINAL MEMBRANE (ERM) OF LEFT EYE: ICD-10-CM

## 2021-06-24 PROCEDURE — 92014 COMPRE OPH EXAM EST PT 1/>: CPT | Performed by: OPTOMETRIST

## 2021-06-24 ASSESSMENT — REFRACTION_WEARINGRX
SPECS_TYPE: BIFOCAL
OD_ADD: +2.50
OS_CYLINDER: +0.50
OD_AXIS: 172
OD_SPHERE: +0.50
OS_SPHERE: +0.25
OD_CYLINDER: +0.50
OS_ADD: +2.50
OS_AXIS: 002

## 2021-06-24 ASSESSMENT — SLIT LAMP EXAM - LIDS
COMMENTS: 1+ BLEPHARITIS
COMMENTS: 1+ BLEPHARITIS

## 2021-06-24 ASSESSMENT — VISUAL ACUITY
OD_SC: 20/50
OS_SC: 20/200
OD_CC: 20/40
OD_CC: 20/30
OS_SC: 20/50
OS_CC: 20/30
OS_CC: 20/40
METHOD: SNELLEN - LINEAR
OD_SC: 20/200

## 2021-06-24 ASSESSMENT — EXTERNAL EXAM - LEFT EYE: OS_EXAM: NORMAL

## 2021-06-24 ASSESSMENT — CUP TO DISC RATIO
OS_RATIO: 0.25
OD_RATIO: 0.25

## 2021-06-24 ASSESSMENT — REFRACTION_MANIFEST
OD_ADD: +2.50
OS_CYLINDER: +0.50
OS_ADD: +2.50
OS_SPHERE: +0.25
OS_AXIS: 180
OD_AXIS: 170
OD_SPHERE: +0.50
OD_CYLINDER: +0.50

## 2021-06-24 ASSESSMENT — CONF VISUAL FIELD
OS_NORMAL: 1
OD_NORMAL: 1

## 2021-06-24 ASSESSMENT — EXTERNAL EXAM - RIGHT EYE: OD_EXAM: NORMAL

## 2021-06-24 ASSESSMENT — TONOMETRY
IOP_METHOD: APPLANATION
OD_IOP_MMHG: 21
OS_IOP_MMHG: 16

## 2021-06-24 NOTE — LETTER
6/24/2021         RE: Ankush Cole  [address]  [place] [facility] [address]        Dear Colleague,    Thank you for referring your patient, Ankush Cole, to the [facility]. Please see a copy of my visit note below.    Chief Complaint   Patient presents with     Annual Eye Exam      Accompanied by Vic  Last Eye Exam: 2019  Dilated Previously: Yes OK to dilate, side effects of dilation explained today    What are you currently using to see?  readers    Distance Vision Acuity: Satisfied with vision    Near Vision Acuity: Satisfied with vision while reading  with readers    Eye Comfort: good  Do you use eye drops? : No    Doraangel BurrellC[facility]      Medical, surgical and family histories reviewed and updated 6/24/2021.       OBJECTIVE: See Ophthalmology exam    ASSESSMENT:    ICD-10-CM    1. Encounter for examination of eyes and vision with abnormal findings  Z01.01    2. Type 2 diabetes mellitus without retinopathy (H)  E11.9    3. Nuclear age-related cataract, both eyes  H25.13    4. Epiretinal membrane (ERM) of left eye  H35.372    5. Regular astigmatism of both eyes  H52.223    6. Presbyopia  H52.4       PLAN:     Patient Instructions   Patient educated on importance of good blood sugar control.  Letter sent to primary care provider with diabetic eye exam report.     You have the start of mild cataracts.  You may notice some blurred vision or glare with night driving.  It is important that you wear good sunglasses to protect your eyes from the ultraviolet light from the sun.     You have an epiretinal membrane.  As we grow older, the thick vitreous gel in the middle of our eyes begins to shrink and pull away from the macula. As the vitreous pulls away, scar tissue may develop on the macula. Sometimes the scar tissue can warp and contract, causing the retina to wrinkle or become swollen or distorted.    Reading glasses prescription provided today, per patient  request.     Return in 1 year for a comprehensive eye exam, or sooner if needed.     The effects of the dilating drops last for 4- 6 hours.  You will be more sensitive to light and vision will be blurry up close.  Mydriatic sunglasses were given if needed.    Jakob Pretty, KELSIE  [facility]  [address] [place] [address]. [facility] [place]  [address]    [phone]             Again, thank you for allowing me to participate in the care of your patient.        Sincerely,        Jakob Pretty OD

## 2021-06-24 NOTE — PATIENT INSTRUCTIONS
Patient educated on importance of good blood sugar control.  Letter sent to primary care provider with diabetic eye exam report.     You have the start of mild cataracts.  You may notice some blurred vision or glare with night driving.  It is important that you wear good sunglasses to protect your eyes from the ultraviolet light from the sun.     You have an epiretinal membrane.  As we grow older, the thick vitreous gel in the middle of our eyes begins to shrink and pull away from the macula. As the vitreous pulls away, scar tissue may develop on the macula. Sometimes the scar tissue can warp and contract, causing the retina to wrinkle or become swollen or distorted.    Reading glasses prescription provided today, per patient request.     Return in 1 year for a comprehensive eye exam, or sooner if needed.     The effects of the dilating drops last for 4- 6 hours.  You will be more sensitive to light and vision will be blurry up close.  Mydriatic sunglasses were given if needed.    Jakob Pretty, KELSIE  [address]  [place]  [address] [phone]

## 2021-06-24 NOTE — PROGRESS NOTES
Chief Complaint   Patient presents with     Annual Eye Exam      Accompanied by Vic  Last Eye Exam: 2019  Dilated Previously: Yes OK to dilate, side effects of dilation explained today    What are you currently using to see?  readers    Distance Vision Acuity: Satisfied with vision    Near Vision Acuity: Satisfied with vision while reading  with readers    Eye Comfort: good  Do you use eye drops? : No    Dora Ferreira      Medical, surgical and family histories reviewed and updated 6/24/2021.       OBJECTIVE: See Ophthalmology exam    ASSESSMENT:    ICD-10-CM    1. Encounter for examination of eyes and vision with abnormal findings  Z01.01    2. Type 2 diabetes mellitus without retinopathy (H)  E11.9    3. Nuclear age-related cataract, both eyes  H25.13    4. Epiretinal membrane (ERM) of left eye  H35.372    5. Regular astigmatism of both eyes  H52.223    6. Presbyopia  H52.4       PLAN:     Patient Instructions   Patient educated on importance of good blood sugar control.  Letter sent to primary care provider with diabetic eye exam report.     You have the start of mild cataracts.  You may notice some blurred vision or glare with night driving.  It is important that you wear good sunglasses to protect your eyes from the ultraviolet light from the sun.     You have an epiretinal membrane.  As we grow older, the thick vitreous gel in the middle of our eyes begins to shrink and pull away from the macula. As the vitreous pulls away, scar tissue may develop on the macula. Sometimes the scar tissue can warp and contract, causing the retina to wrinkle or become swollen or distorted.    Reading glasses prescription provided today, per patient request.     Return in 1 year for a comprehensive eye exam, or sooner if needed.     The effects of the dilating drops last for 4- 6 hours.  You will be more sensitive to light and vision will be blurry up close.  Mydriatic sunglasses were given if needed.    Jakob Pretty,  KELSIE BECKER [facility]  [address] [place]  [address]    [phone]

## 2021-07-23 ENCOUNTER — APPOINTMENT (OUTPATIENT)
Dept: OPTOMETRY | Facility: CLINIC | Age: 71
End: 2021-07-23
Payer: COMMERCIAL

## 2021-07-23 PROCEDURE — 92340 FIT SPECTACLES MONOFOCAL: CPT | Performed by: OPTOMETRIST

## 2022-02-22 DIAGNOSIS — E11.9 TYPE 2 DIABETES MELLITUS WITHOUT COMPLICATION, WITHOUT LONG-TERM CURRENT USE OF INSULIN (H): ICD-10-CM

## 2022-02-23 RX ORDER — BLOOD-GLUCOSE METER
EACH MISCELLANEOUS
Qty: 1 KIT | Refills: 0 | Status: SHIPPED | OUTPATIENT
Start: 2022-02-23 | End: 2022-04-20

## 2022-02-25 DIAGNOSIS — E11.9 TYPE 2 DIABETES MELLITUS WITHOUT COMPLICATION, WITHOUT LONG-TERM CURRENT USE OF INSULIN (H): ICD-10-CM

## 2022-02-28 ENCOUNTER — APPOINTMENT (OUTPATIENT)
Dept: INTERPRETER SERVICES | Facility: CLINIC | Age: 72
End: 2022-02-28
Payer: COMMERCIAL

## 2022-02-28 NOTE — TELEPHONE ENCOUNTER
Called patient with a [employer]  and left a voicemail message that patient will need to be seen at the clinic to get any refills of her diabetic supplies.    TY Brown  [facility]

## 2022-03-03 ENCOUNTER — TELEPHONE (OUTPATIENT)
Dept: FAMILY MEDICINE | Facility: CLINIC | Age: 72
End: 2022-03-03
Payer: COMMERCIAL

## 2022-03-03 DIAGNOSIS — E11.9 TYPE 2 DIABETES MELLITUS WITHOUT COMPLICATION, WITHOUT LONG-TERM CURRENT USE OF INSULIN (H): Primary | ICD-10-CM

## 2022-03-03 NOTE — TELEPHONE ENCOUNTER
Patient's daughter calling, consent to communicate on file. Appointment with Dr. Rubio made for 4/20/22. Sarah refill provided for lancets and test strips as daughter states they do not have enough to last until appointment.     Victorina Domínguez RN   [facility]

## 2022-03-03 NOTE — TELEPHONE ENCOUNTER
LMTCB     2nd attempt to contact patient    Called with [employer] interpretor on the line     Please assist patient in scheduling appt, as below is due for a visit in office to get any refills of her diabetic supplies    Jemma ZAYAS CMA

## 2022-03-06 RX ORDER — BLOOD-GLUCOSE METER
EACH MISCELLANEOUS
Qty: 1 KIT | Refills: 0 | Status: SHIPPED | OUTPATIENT
Start: 2022-03-06

## 2022-03-07 ENCOUNTER — TELEPHONE (OUTPATIENT)
Dept: FAMILY MEDICINE | Facility: CLINIC | Age: 72
End: 2022-03-07
Payer: COMMERCIAL

## 2022-03-07 NOTE — TELEPHONE ENCOUNTER
Her appoint is for physical and I only got 20min on 4/20/22 at 11:40am  Please move her to 40min appointment  Thanks

## 2022-03-07 NOTE — TELEPHONE ENCOUNTER
Called patient and left a voicemail message seeing if patient can come 20 minutes earlier for their appointment.    TY Brown  [facility]

## 2022-03-08 NOTE — TELEPHONE ENCOUNTER
Called patient with an  and left a voicemail message that we were trying to contact patient to see if they can come 20 minutes earlier than what their appointment time is.    TY Brown  [facility]     no

## 2022-04-20 ENCOUNTER — OFFICE VISIT (OUTPATIENT)
Dept: FAMILY MEDICINE | Facility: CLINIC | Age: 72
End: 2022-04-20

## 2022-04-20 VITALS
BODY MASS INDEX: 36.44 KG/M2 | HEART RATE: 68 BPM | DIASTOLIC BLOOD PRESSURE: 72 MMHG | TEMPERATURE: 98 F | OXYGEN SATURATION: 97 % | HEIGHT: 61 IN | WEIGHT: 193 LBS | SYSTOLIC BLOOD PRESSURE: 127 MMHG | RESPIRATION RATE: 18 BRPM

## 2022-04-20 DIAGNOSIS — M19.90 OSTEOARTHRITIS, UNSPECIFIED OSTEOARTHRITIS TYPE, UNSPECIFIED SITE: ICD-10-CM

## 2022-04-20 DIAGNOSIS — E11.9 TYPE 2 DIABETES MELLITUS WITHOUT COMPLICATION, WITHOUT LONG-TERM CURRENT USE OF INSULIN (H): ICD-10-CM

## 2022-04-20 DIAGNOSIS — E55.9 VITAMIN D DEFICIENCY: ICD-10-CM

## 2022-04-20 DIAGNOSIS — Z00.00 ROUTINE GENERAL MEDICAL EXAMINATION AT A HEALTH CARE FACILITY: Primary | ICD-10-CM

## 2022-04-20 DIAGNOSIS — E66.01 MORBID OBESITY (H): ICD-10-CM

## 2022-04-20 DIAGNOSIS — Z00.00 ENCOUNTER FOR MEDICARE ANNUAL WELLNESS EXAM: ICD-10-CM

## 2022-04-20 DIAGNOSIS — Z13.220 SCREENING FOR HYPERLIPIDEMIA: ICD-10-CM

## 2022-04-20 DIAGNOSIS — Z12.31 VISIT FOR SCREENING MAMMOGRAM: ICD-10-CM

## 2022-04-20 LAB
HBA1C MFR BLD: 6.5 % (ref 0–5.6)
HOLD SPECIMEN: NORMAL

## 2022-04-20 PROCEDURE — 83036 HEMOGLOBIN GLYCOSYLATED A1C: CPT | Performed by: FAMILY MEDICINE

## 2022-04-20 PROCEDURE — 99214 OFFICE O/P EST MOD 30 MIN: CPT | Mod: 25 | Performed by: FAMILY MEDICINE

## 2022-04-20 PROCEDURE — 36415 COLL VENOUS BLD VENIPUNCTURE: CPT | Performed by: FAMILY MEDICINE

## 2022-04-20 PROCEDURE — 82043 UR ALBUMIN QUANTITATIVE: CPT | Performed by: FAMILY MEDICINE

## 2022-04-20 PROCEDURE — 82306 VITAMIN D 25 HYDROXY: CPT | Performed by: FAMILY MEDICINE

## 2022-04-20 PROCEDURE — 99397 PER PM REEVAL EST PAT 65+ YR: CPT | Mod: 25 | Performed by: FAMILY MEDICINE

## 2022-04-20 PROCEDURE — 90471 IMMUNIZATION ADMIN: CPT | Performed by: FAMILY MEDICINE

## 2022-04-20 PROCEDURE — 90636 HEP A/HEP B VACC ADULT IM: CPT | Performed by: FAMILY MEDICINE

## 2022-04-20 RX ORDER — DICLOFENAC SODIUM 75 MG/1
TABLET, DELAYED RELEASE ORAL
Qty: 60 TABLET | Refills: 3 | Status: SHIPPED | OUTPATIENT
Start: 2022-04-20 | End: 2023-02-28

## 2022-04-20 RX ORDER — LIDOCAINE 50 MG/G
OINTMENT TOPICAL 2 TIMES DAILY
Qty: 50 G | Refills: 3 | Status: SHIPPED | OUTPATIENT
Start: 2022-04-20

## 2022-04-20 RX ORDER — SENNOSIDES 8.6 MG
650 CAPSULE ORAL EVERY 8 HOURS PRN
Qty: 90 TABLET | Refills: 12 | Status: SHIPPED | OUTPATIENT
Start: 2022-04-20 | End: 2024-01-09

## 2022-04-20 RX ORDER — NAPROXEN 500 MG/1
500 TABLET ORAL 2 TIMES DAILY PRN
Qty: 60 TABLET | Refills: 4 | Status: SHIPPED | OUTPATIENT
Start: 2022-04-20 | End: 2023-02-28

## 2022-04-20 RX ORDER — CHOLECALCIFEROL (VITAMIN D3) 50 MCG
TABLET ORAL
Qty: 270 TABLET | Refills: 11 | Status: SHIPPED | OUTPATIENT
Start: 2022-04-20 | End: 2023-05-23

## 2022-04-20 ASSESSMENT — ENCOUNTER SYMPTOMS
JOINT SWELLING: 0
ARTHRALGIAS: 1
MYALGIAS: 0
HEMATURIA: 0
EYE PAIN: 0
SHORTNESS OF BREATH: 0
ABDOMINAL PAIN: 0
HEMATOCHEZIA: 0
NAUSEA: 0
BREAST MASS: 0
PALPITATIONS: 0
COUGH: 0
DIZZINESS: 0
PARESTHESIAS: 0
HEADACHES: 0
DYSURIA: 0
SORE THROAT: 0
CONSTIPATION: 0
WEAKNESS: 0
DIARRHEA: 0
FREQUENCY: 0
CHILLS: 0
FEVER: 0
NERVOUS/ANXIOUS: 0

## 2022-04-20 ASSESSMENT — ACTIVITIES OF DAILY LIVING (ADL)
CURRENT_FUNCTION: HOUSEWORK REQUIRES ASSISTANCE
CURRENT_FUNCTION: TELEPHONE REQUIRES ASSISTANCE
CURRENT_FUNCTION: TRANSPORTATION REQUIRES ASSISTANCE
CURRENT_FUNCTION: MONEY MANAGEMENT REQUIRES ASSISTANCE
CURRENT_FUNCTION: PREPARING MEALS REQUIRES ASSISTANCE
CURRENT_FUNCTION: LAUNDRY REQUIRES ASSISTANCE
CURRENT_FUNCTION: SHOPPING REQUIRES ASSISTANCE

## 2022-04-20 ASSESSMENT — PAIN SCALES - GENERAL: PAINLEVEL: NO PAIN (0)

## 2022-04-20 NOTE — LETTER
April 26, 2022      Ankush Cole  [address]  [facility] [place] [facility] [address]        Dear ,    All your results are essentially normal. Please contact me if you have any questions.     Take care,     Sherrill Rubio D.O.       Resulted Orders   HEMOGLOBIN A1C   Result Value Ref Range    Hemoglobin A1C 6.5 (H) 0.0 - 5.6 %      Comment:      Normal <5.7%   Prediabetes 5.7-6.4%    Diabetes 6.5% or higher     Note: Adopted from ADA consensus guidelines.   Albumin Random Urine Quantitative with Creat Ratio   Result Value Ref Range    Creatinine Urine mg/dL 167 mg/dL    Albumin Urine mg/L 10 mg/L    Albumin Urine mg/g Cr 5.99 0.00 - 25.00 mg/g Cr   Vitamin D Deficiency   Result Value Ref Range    Vitamin D, Total (25-Hydroxy) 39 20 - 75 ug/L    Narrative    Season, race, dietary intake, and treatment affect the concentration of 25-hydroxy-Vitamin D. Values may decrease during winter months and increase during summer months. Values 20-29 ug/L may indicate Vitamin D insufficiency and values <20 ug/L may indicate Vitamin D deficiency.    Vitamin D determination is routinely performed by an immunoassay specific for 25 hydroxyvitamin D3.  If an individual is on vitamin D2(ergocalciferol) supplementation, please specify 25 OH vitamin D2 and D3 level determination by LCMSMS test VITD23.

## 2022-04-20 NOTE — PROGRESS NOTES
"SUBJECTIVE:   Ankush Cole is a 72 year old female who presents for Preventive Visit.      Patient has been advised of split billing requirements and indicates understanding: Yes  Are you in the first 12 months of your Medicare coverage?  No    Healthy Habits:     In general, how would you rate your overall health?  Fair    Frequency of exercise:  4-5 days/week    Duration of exercise:  15-30 minutes    Do you usually eat at least 4 servings of fruit and vegetables a day, include whole grains    & fiber and avoid regularly eating high fat or \"junk\" foods?  Yes    Taking medications regularly:  Yes    Medication side effects:  None    Ability to successfully perform activities of daily living:  Telephone requires assistance, transportation requires assistance, shopping requires assistance, preparing meals requires assistance, housework requires assistance, laundry requires assistance and money management requires assistance    Home Safety:  No safety concerns identified    Hearing Impairment:  No hearing concerns    In the past 6 months, have you been bothered by leaking of urine?  No    In general, how would you rate your overall mental or emotional health?  Good      PHQ-2 Total Score: 0    Additional concerns today:  No    Do you feel safe in your environment? Yes    Have you ever done Advance Care Planning? (For example, a Health Directive, POLST, or a discussion with a medical provider or your loved ones about your wishes): No, advance care planning information given to patient to review.  Patient plans to discuss their wishes with loved ones or provider.         Fall risk  Fallen 2 or more times in the past year?: No  Any fall with injury in the past year?: No    Cognitive Screening Not appropriate due to Language Barrier    Do you have sleep apnea, excessive snoring or daytime drowsiness?: no    Reviewed and updated as needed this visit by clinical staff   Tobacco  Allergies  Meds   Med Hx  Surg Hx  " Fam Hx  Soc Hx          5 days a week  [facility]    3 years doing it    Right ear pain at times off and  , no ringing in ears      Eye exam in June      Reviewed and updated as needed this visit by Provider                   Social History     Tobacco Use     Smoking status: Never Smoker     Smokeless tobacco: Never Used   Substance Use Topics     Alcohol use: No         Alcohol Use 4/20/2022   Prescreen: >3 drinks/day or >7 drinks/week? No   Prescreen: >3 drinks/day or >7 drinks/week? -       Diabetes Follow-up    How often are you checking your blood sugar? About four times a week  What time of day are you checking your blood sugars (select all that apply)?  Before meals  Have you had any blood sugars above 200?  No  Have you had any blood sugars below 70?  No    What symptoms do you notice when your blood sugar is low?  None    What concerns do you have today about your diabetes? None     Do you have any of these symptoms? (Select all that apply)  No numbness or tingling in feet.  No redness, sores or blisters on feet.  No complaints of excessive thirst.  No reports of blurry vision.  No significant changes to weight.      BP Readings from Last 2 Encounters:   04/20/22 127/72   11/04/20 126/72     Hemoglobin A1C POCT (%)   Date Value   11/04/2020 6.7 (H)   09/04/2019 6.6 (H)     Hemoglobin A1C (%)   Date Value   04/20/2022 6.5 (H)     LDL Cholesterol Calculated (mg/dL)   Date Value   09/04/2019 128 (H)   03/24/2017 102 (H)                 Current providers sharing in care for this patient include:   Patient Care Team:  Sherrill Rubio DO as PCP - General (Family Practice)  Sherrill Rubio DO as Assigned PCP  Suzette Blanc MD as MD (Student in organized health care education/training program)  Jakob Pretty OD as Assigned Surgical Provider    The following health maintenance items are reviewed in Epic and correct as of today:  Health Maintenance Due   Topic Date  "Due     BMP  09/04/2020     LIPID  09/04/2020     MAMMO SCREENING  09/11/2021     MICROALBUMIN  11/04/2021     FALL RISK ASSESSMENT  11/04/2021     BP Readings from Last 3 Encounters:   04/20/22 127/72   11/04/20 126/72   09/04/19 124/70    Wt Readings from Last 3 Encounters:   04/20/22 87.5 kg (193 lb)   11/04/20 87.1 kg (192 lb)   09/04/19 89.4 kg (197 lb)                  Mammogram Screening: Mammogram Screening - Patient over age 75, has elected to discontinue screenings.        Review of Systems   Constitutional: Negative for chills and fever.   HENT: Positive for ear pain. Negative for congestion, hearing loss and sore throat.    Eyes: Negative for pain and visual disturbance.   Respiratory: Negative for cough and shortness of breath.    Cardiovascular: Negative for chest pain, palpitations and peripheral edema.   Gastrointestinal: Negative for abdominal pain, constipation, diarrhea, hematochezia and nausea.   Breasts:  Negative for tenderness, breast mass and discharge.   Genitourinary: Negative for dysuria, frequency, genital sores, hematuria, pelvic pain, urgency, vaginal bleeding and vaginal discharge.   Musculoskeletal: Positive for arthralgias. Negative for joint swelling and myalgias.   Skin: Negative for rash.   Neurological: Negative for dizziness, weakness, headaches and paresthesias.   Psychiatric/Behavioral: Negative for mood changes. The patient is not nervous/anxious.      Constitutional, HEENT, cardiovascular, pulmonary, GI, , musculoskeletal, neuro, skin, endocrine and psych systems are negative, except as otherwise noted.    OBJECTIVE:   /72   Pulse 68   Temp 98  F (36.7  C) (Oral)   Resp 18   Ht 1.549 m (5' 1\")   Wt 87.5 kg (193 lb)   SpO2 97%   BMI 36.47 kg/m   Estimated body mass index is 36.47 kg/m  as calculated from the following:    Height as of this encounter: 1.549 m (5' 1\").    Weight as of this encounter: 87.5 kg (193 lb).  Physical Exam  GENERAL: healthy, alert and " no distress  EYES: Eyes grossly normal to inspection, PERRL and conjunctivae and sclerae normal  HENT: ear canals and TM's normal, nose and mouth without ulcers or lesions  NECK: no adenopathy, no asymmetry, masses, or scars and thyroid normal to palpation  RESP: lungs clear to auscultation - no rales, rhonchi or wheezes  BREAST: normal without masses, tenderness or nipple discharge and no palpable axillary masses or adenopathy  CV: regular rate and rhythm, normal S1 S2, no S3 or S4, no murmur, click or rub, no peripheral edema and peripheral pulses strong  ABDOMEN: soft, nontender, no hepatosplenomegaly, no masses and bowel sounds normal  MS: no gross musculoskeletal defects noted, no edema  -normal exam, normal bimaual  SKIN: no suspicious lesions or rashes  NEURO: Normal strength and tone, mentation intact and speech normal  PSYCH: mentation appears normal, affect normal/bright    Diagnostic Test Results:  Labs reviewed in Epic    ASSESSMENT / PLAN:       ICD-10-CM    1. Routine general medical examination at a health care facility  Z00.00 vitamin D3 (VITAMIN D3) 50 mcg (2000 units) tablet   2. Screening for hyperlipidemia  Z13.220 Lipid panel reflex to direct LDL Fasting   3. Encounter for Medicare annual wellness exam  Z00.00    4. Visit for screening mammogram  Z12.31 MA SCREENING DIGITAL BILAT - Future  (s+30)   5. Type 2 diabetes mellitus without complication, without long-term current use of insulin (H)  E11.9 HEMOGLOBIN A1C     Albumin Random Urine Quantitative with Creat Ratio     FOOT EXAM     Comprehensive metabolic panel (BMP + Alb, Alk Phos, ALT, AST, Total. Bili, TP)   6. Morbid obesity (H)  E66.01    7. Vitamin D deficiency  E55.9 Vitamin D Deficiency     Vitamin D Deficiency   8. Osteoarthritis, unspecified osteoarthritis type, unspecified site  M19.90 diclofenac (VOLTAREN) 75 MG EC tablet     naproxen (NAPROSYN) 500 MG tablet     acetaminophen (TYLENOL) 650 MG CR tablet     lidocaine (XYLOCAINE)  "5 % external ointment     DIABETES MELLITUS-stable, cont diet and exercise,Eye exam in June  BMI 36-as above  Osteopenia-cont vit D  Arthritis(knee pain) doing well with topical and prn naproxen, risks discussed  Vit d def-cont vit D  Hep a /b shot today    Conitnue exercising  Follow up in 6 months    Patient has been advised of split billing requirements and indicates understanding: Yes    COUNSELING:  Reviewed preventive health counseling, as reflected in patient instructions    Estimated body mass index is 36.47 kg/m  as calculated from the following:    Height as of this encounter: 1.549 m (5' 1\").    Weight as of this encounter: 87.5 kg (193 lb).    Weight management plan: Discussed healthy diet and exercise guidelines    She reports that she has never smoked. She has never used smokeless tobacco.      Appropriate preventive services were discussed with this patient, including applicable screening as appropriate for cardiovascular disease, diabetes, osteopenia/osteoporosis, and glaucoma.  As appropriate for age/gender, discussed screening for colorectal cancer, prostate cancer, breast cancer, and cervical cancer. Checklist reviewing preventive services available has been given to the patient.    Reviewed patients plan of care and provided an AVS. The Intermediate Care Plan ( asthma action plan, low back pain action plan, and migraine action plan) for Ankush meets the Care Plan requirement. This Care Plan has been established and reviewed with the Patient and daughter.    Counseling Resources:  ATP IV Guidelines  Pooled Cohorts Equation Calculator  Breast Cancer Risk Calculator  Breast Cancer: Medication to Reduce Risk  FRAX Risk Assessment  ICSI Preventive Guidelines  Dietary Guidelines for Americans, 2010  USDA's MyPlate  ASA Prophylaxis  Lung CA Screening    DO ROSITA [facility]    Identified Health Risks:l  "

## 2022-04-20 NOTE — PATIENT INSTRUCTIONS
Eye exam in June  Hep a /b shot today  Diabetes mellitus looks great  Conitnue exercising  Follow up in 6 months        Mammogram Scheduling  [facility] [phone] or [phone]  [facility] [phone]        Patient Education   Personalized Prevention Plan  You are due for the preventive services outlined below.  Your care team is available to assist you in scheduling these services.  If you have already completed any of these items, please share that information with your care team to update in your medical record.  Health Maintenance Due   Topic Date Due    ANNUAL REVIEW OF HM ORDERS  Never done    Diabetic Foot Exam  05/02/2019    Basic Metabolic Panel  09/04/2020    Cholesterol Lab  09/04/2020    A1C Lab  05/04/2021    Mammogram  09/11/2021    Annual Wellness Visit  11/04/2021    Kidney Microalbumin Urine Test  11/04/2021    FALL RISK ASSESSMENT  11/04/2021

## 2022-04-21 LAB
CREAT UR-MCNC: 167 MG/DL
DEPRECATED CALCIDIOL+CALCIFEROL SERPL-MC: 39 UG/L (ref 20–75)
MICROALBUMIN UR-MCNC: 10 MG/L
MICROALBUMIN/CREAT UR: 5.99 MG/G CR (ref 0–25)

## 2022-04-25 NOTE — RESULT ENCOUNTER NOTE
All your results are essentially hattie. Please contact me if you have any questions.  Take care,  Sherrill Rubio D.O.

## 2022-05-18 ENCOUNTER — ANCILLARY PROCEDURE (OUTPATIENT)
Dept: MAMMOGRAPHY | Facility: CLINIC | Age: 72
End: 2022-05-18
Attending: FAMILY MEDICINE
Payer: COMMERCIAL

## 2022-05-18 DIAGNOSIS — Z12.31 VISIT FOR SCREENING MAMMOGRAM: ICD-10-CM

## 2022-05-18 PROCEDURE — 77067 SCR MAMMO BI INCL CAD: CPT | Mod: TC | Performed by: RADIOLOGY

## 2022-05-19 NOTE — RESULT ENCOUNTER NOTE
Mammo results managed by the breast center. Results communicated to the patient by their office.   Sherrill Rubio D.O.

## 2022-05-23 ENCOUNTER — ALLIED HEALTH/NURSE VISIT (OUTPATIENT)
Dept: FAMILY MEDICINE | Facility: CLINIC | Age: 72
End: 2022-05-23
Payer: COMMERCIAL

## 2022-05-23 DIAGNOSIS — Z23 NEED FOR HEPATITIS A AND B VACCINATION: Primary | ICD-10-CM

## 2022-05-23 PROCEDURE — 99207 PR NO CHARGE NURSE ONLY: CPT

## 2022-05-23 PROCEDURE — 90636 HEP A/HEP B VACC ADULT IM: CPT

## 2022-05-23 PROCEDURE — 90471 IMMUNIZATION ADMIN: CPT

## 2022-10-10 ENCOUNTER — OFFICE VISIT (OUTPATIENT)
Dept: OPTOMETRY | Facility: CLINIC | Age: 72
End: 2022-10-10
Payer: COMMERCIAL

## 2022-10-10 DIAGNOSIS — E11.9 TYPE 2 DIABETES MELLITUS WITHOUT RETINOPATHY (H): ICD-10-CM

## 2022-10-10 DIAGNOSIS — Z01.01 ENCOUNTER FOR EXAMINATION OF EYES AND VISION WITH ABNORMAL FINDINGS: Primary | ICD-10-CM

## 2022-10-10 DIAGNOSIS — H25.13 NUCLEAR AGE-RELATED CATARACT, BOTH EYES: ICD-10-CM

## 2022-10-10 DIAGNOSIS — H52.03 HYPERMETROPIA, BILATERAL: ICD-10-CM

## 2022-10-10 DIAGNOSIS — H52.223 REGULAR ASTIGMATISM OF BOTH EYES: ICD-10-CM

## 2022-10-10 DIAGNOSIS — H52.4 PRESBYOPIA: ICD-10-CM

## 2022-10-10 DIAGNOSIS — H35.372 EPIRETINAL MEMBRANE (ERM) OF LEFT EYE: ICD-10-CM

## 2022-10-10 PROCEDURE — 92015 DETERMINE REFRACTIVE STATE: CPT | Performed by: OPTOMETRIST

## 2022-10-10 PROCEDURE — 92014 COMPRE OPH EXAM EST PT 1/>: CPT | Performed by: OPTOMETRIST

## 2022-10-10 ASSESSMENT — CONF VISUAL FIELD
OS_INFERIOR_TEMPORAL_RESTRICTION: 0
METHOD: COUNTING FINGERS
OD_SUPERIOR_NASAL_RESTRICTION: 0
OD_INFERIOR_TEMPORAL_RESTRICTION: 0
OD_SUPERIOR_TEMPORAL_RESTRICTION: 0
OS_SUPERIOR_NASAL_RESTRICTION: 0
OS_SUPERIOR_TEMPORAL_RESTRICTION: 0
OD_INFERIOR_NASAL_RESTRICTION: 0
OS_INFERIOR_NASAL_RESTRICTION: 0
OS_NORMAL: 1
OD_NORMAL: 1

## 2022-10-10 ASSESSMENT — REFRACTION_WEARINGRX
OD_CYLINDER: +0.50
OS_CYLINDER: +0.50
SPECS_TYPE: READING
OS_AXIS: 180
OS_SPHERE: +2.75
OD_AXIS: 170
OD_SPHERE: +3.00

## 2022-10-10 ASSESSMENT — SLIT LAMP EXAM - LIDS
COMMENTS: NORMAL
COMMENTS: NORMAL

## 2022-10-10 ASSESSMENT — REFRACTION_MANIFEST
OD_ADD: +2.50
OD_AXIS: 170
OS_SPHERE: +0.50
OD_CYLINDER: +0.50
OS_AXIS: 180
OD_SPHERE: +0.75
OS_CYLINDER: +0.50
OS_ADD: +2.50

## 2022-10-10 ASSESSMENT — VISUAL ACUITY
OS_CC: 20/30
METHOD: NUMBERS - LINEAR
OS_SC: 20/40
OD_SC: 20/60
OD_SC: 20/400
OD_CC: 20/30
OS_SC: 20/200
CORRECTION_TYPE: GLASSES

## 2022-10-10 ASSESSMENT — EXTERNAL EXAM - RIGHT EYE: OD_EXAM: NORMAL

## 2022-10-10 ASSESSMENT — TONOMETRY
OS_IOP_MMHG: 14
OD_IOP_MMHG: 14
IOP_METHOD: APPLANATION

## 2022-10-10 ASSESSMENT — CUP TO DISC RATIO
OS_RATIO: 0.25
OD_RATIO: 0.25

## 2022-10-10 ASSESSMENT — EXTERNAL EXAM - LEFT EYE: OS_EXAM: NORMAL

## 2022-10-10 NOTE — PROGRESS NOTES
Chief Complaint   Patient presents with     Diabetic Eye Exam     Borderline - no meds     Accompanied by daughter/, Jass     Chief Complaint(s) and History of Present Illness(es)     Diabetic Eye Exam            Diabetes Type: controlled with diet    Duration: years    Blood Sugars: fluctuates    Comments: Borderline - no meds               Lab Results   Component Value Date    A1C 6.5 04/20/2022    A1C 6.7 11/04/2020    A1C 6.6 09/04/2019    A1C 6.5 05/02/2018    A1C 6.2 11/21/2017    A1C 6.2 03/24/2017          Last Eye Exam: 6/24/2021  Dilated Previously: Yes, side effects of dilation explained today    What are you currently using to see?  Glasses - Rx reading glasses     Distance Vision Acuity: Satisfied with vision    Near Vision Acuity: Satisfied with vision while reading  with glasses    Eye Comfort: good  Do you use eye drops? : No  Occupation or Hobbies: Retired    [place] [facility]     Medical, surgical and family histories reviewed and updated 10/10/2022.       OBJECTIVE: See Ophthalmology exam    ASSESSMENT:    ICD-10-CM    1. Encounter for examination of eyes and vision with abnormal findings  Z01.01       2. Type 2 diabetes mellitus without retinopathy (H)  E11.9       3. Nuclear age-related cataract, both eyes  H25.13       4. Epiretinal membrane (ERM) of left eye  H35.372       5. Hypermetropia, bilateral  H52.03       6. Regular astigmatism of both eyes  H52.223       7. Presbyopia  H52.4           PLAN:    Ankush Cole aware  eye exam results will be sent to Sherrill Rubio.  Patient Instructions   Patient educated on importance of good blood sugar control.  Letter sent to primary care provider with diabetic eye exam report.     You have an epiretinal membrane.  As we grow older, the thick vitreous gel in the middle of our eyes begins to shrink and pull away from the macula. As the vitreous pulls away, scar tissue may develop on the macula. Sometimes the scar tissue can warp  and contract, causing the retina to wrinkle or become swollen or distorted.    You have the start of mild cataracts.  You may notice some blurred vision or glare with night driving.  It is important that you wear good sunglasses to protect your eyes from the ultraviolet light from the sun.     Reading glasses prescription provided today. Optional to fill, minimal change.    Return in 1 year for a comprehensive eye exam, or sooner if needed.      The effects of the dilating drops last for 4- 6 hours.  You will be more sensitive to light and vision will be blurry up close.  Mydriatic sunglasses were given if needed.     Jakob Pretty, OD  [facility]  [address] [place] [address]. MAYELIN Jama  [id number] [phone]

## 2022-10-10 NOTE — LETTER
10/10/2022         RE: Ankush Cole  [address]  [place] [address]        Dear Colleague,    Thank you for referring your patient, Ankush Cole, to the [facility]. Please see a copy of my visit note below.    Chief Complaint   Patient presents with     Diabetic Eye Exam     Borderline - no meds     Accompanied by daughter/, Jass     Chief Complaint(s) and History of Present Illness(es)     Diabetic Eye Exam            Diabetes Type: controlled with diet    Duration: years    Blood Sugars: fluctuates    Comments: Borderline - no meds               Lab Results   Component Value Date    A1C 6.5 04/20/2022    A1C 6.7 11/04/2020    A1C 6.6 09/04/2019    A1C 6.5 05/02/2018    A1C 6.2 11/21/2017    A1C 6.2 03/24/2017          Last Eye Exam: 6/24/2021  Dilated Previously: Yes, side effects of dilation explained today    What are you currently using to see?  Glasses - Rx reading glasses     Distance Vision Acuity: Satisfied with vision    Near Vision Acuity: Satisfied with vision while reading  with glasses    Eye Comfort: good  Do you use eye drops? : No  Occupation or Hobbies: Retired    [place] [employer]     Medical, surgical and family histories reviewed and updated 10/10/2022.       OBJECTIVE: See Ophthalmology exam    ASSESSMENT:    ICD-10-CM    1. Encounter for examination of eyes and vision with abnormal findings  Z01.01       2. Type 2 diabetes mellitus without retinopathy (H)  E11.9       3. Nuclear age-related cataract, both eyes  H25.13       4. Epiretinal membrane (ERM) of left eye  H35.372       5. Hypermetropia, bilateral  H52.03       6. Regular astigmatism of both eyes  H52.223       7. Presbyopia  H52.4           PLAN:    Ankush Cole aware  eye exam results will be sent to Sherrill Rubio.  Patient Instructions   Patient educated on importance of good blood sugar control.  Letter sent to primary care provider with diabetic  eye exam report.     You have an epiretinal membrane.  As we grow older, the thick vitreous gel in the middle of our eyes begins to shrink and pull away from the macula. As the vitreous pulls away, scar tissue may develop on the macula. Sometimes the scar tissue can warp and contract, causing the retina to wrinkle or become swollen or distorted.    You have the start of mild cataracts.  You may notice some blurred vision or glare with night driving.  It is important that you wear good sunglasses to protect your eyes from the ultraviolet light from the sun.     Reading glasses prescription provided today. Optional to fill, minimal change.    Return in 1 year for a comprehensive eye exam, or sooner if needed.      The effects of the dilating drops last for 4- 6 hours.  You will be more sensitive to light and vision will be blurry up close.  Mydriatic sunglasses were given if needed.     Jakob Pretty OD  [address] [place]  [address]    [phone]              Again, thank you for allowing me to participate in the care of your patient.        Sincerely,        Jakob Pretty OD

## 2023-01-17 ENCOUNTER — TRANSFERRED RECORDS (OUTPATIENT)
Dept: HEALTH INFORMATION MANAGEMENT | Facility: CLINIC | Age: 73
End: 2023-01-17

## 2023-01-27 ENCOUNTER — ALLIED HEALTH/NURSE VISIT (OUTPATIENT)
Dept: FAMILY MEDICINE | Facility: CLINIC | Age: 73
End: 2023-01-27
Payer: COMMERCIAL

## 2023-01-27 DIAGNOSIS — Z23 NEED FOR VACCINATION WITH TWINRIX: Primary | ICD-10-CM

## 2023-01-27 DIAGNOSIS — Z23 NEED FOR PROPHYLACTIC VACCINATION AND INOCULATION AGAINST INFLUENZA: ICD-10-CM

## 2023-01-27 DIAGNOSIS — E11.9 TYPE 2 DIABETES MELLITUS WITHOUT COMPLICATION, WITHOUT LONG-TERM CURRENT USE OF INSULIN (H): Primary | ICD-10-CM

## 2023-01-27 PROCEDURE — 90636 HEP A/HEP B VACC ADULT IM: CPT

## 2023-01-27 PROCEDURE — 90472 IMMUNIZATION ADMIN EACH ADD: CPT

## 2023-01-27 PROCEDURE — 90471 IMMUNIZATION ADMIN: CPT

## 2023-01-27 PROCEDURE — 99207 PR NO CHARGE NURSE ONLY: CPT

## 2023-01-27 PROCEDURE — 90662 IIV NO PRSV INCREASED AG IM: CPT

## 2023-01-27 RX ORDER — LANCETS
EACH MISCELLANEOUS
Qty: 100 EACH | Refills: 0 | Status: SHIPPED | OUTPATIENT
Start: 2023-01-27 | End: 2023-05-23

## 2023-01-27 NOTE — PROGRESS NOTES
Prior to immunization administration, verified patients identity using patient s name and date of birth. Please see Immunization Activity for additional information.     Screening Questionnaire for Adult Immunization    Are you sick today?   No   Do you have allergies to medications, food, a vaccine component or latex?   No   Have you ever had a serious reaction after receiving a vaccination?   No   Do you have a long-term health problem with heart, lung, kidney, or metabolic disease (e.g., diabetes), asthma, a blood disorder, no spleen, complement component deficiency, a cochlear implant, or a spinal fluid leak?  Are you on long-term aspirin therapy?   No   Do you have cancer, leukemia, HIV/AIDS, or any other immune system problem?   No   Do you have a parent, brother, or sister with an immune system problem?   No   In the past 3 months, have you taken medications that affect  your immune system, such as prednisone, other steroids, or anticancer drugs; drugs for the treatment of rheumatoid arthritis, Crohn s disease, or psoriasis; or have you had radiation treatments?   No   Have you had a seizure, or a brain or other nervous system problem?   No   During the past year, have you received a transfusion of blood or blood    products, or been given immune (gamma) globulin or antiviral drug?   No   For women: Are you pregnant or is there a chance you could become       pregnant during the next month?   No   Have you received any vaccinations in the past 4 weeks?   No     Immunization questionnaire answers were all negative.        Per orders of Dr. Rubio, injection of Twinrix #3 and Flu given by Doreen Boggs CMA. Patient instructed to remain in clinic for 15 minutes afterwards, and to report any adverse reaction to me immediately.       Screening performed by Doreen Boggs CMA on 1/27/2023 at 9:40 AM.

## 2023-02-28 ENCOUNTER — OFFICE VISIT (OUTPATIENT)
Dept: FAMILY MEDICINE | Facility: CLINIC | Age: 73
End: 2023-02-28
Payer: COMMERCIAL

## 2023-02-28 VITALS
BODY MASS INDEX: 35.87 KG/M2 | RESPIRATION RATE: 16 BRPM | HEART RATE: 62 BPM | WEIGHT: 190 LBS | OXYGEN SATURATION: 100 % | SYSTOLIC BLOOD PRESSURE: 128 MMHG | HEIGHT: 61 IN | TEMPERATURE: 98 F | DIASTOLIC BLOOD PRESSURE: 72 MMHG

## 2023-02-28 DIAGNOSIS — M85.80 OSTEOPENIA, UNSPECIFIED LOCATION: ICD-10-CM

## 2023-02-28 DIAGNOSIS — E66.01 MORBID OBESITY (H): ICD-10-CM

## 2023-02-28 DIAGNOSIS — E55.9 VITAMIN D DEFICIENCY: ICD-10-CM

## 2023-02-28 DIAGNOSIS — Z13.220 SCREENING FOR HYPERLIPIDEMIA: ICD-10-CM

## 2023-02-28 DIAGNOSIS — M19.90 OSTEOARTHRITIS, UNSPECIFIED OSTEOARTHRITIS TYPE, UNSPECIFIED SITE: ICD-10-CM

## 2023-02-28 DIAGNOSIS — Z12.31 VISIT FOR SCREENING MAMMOGRAM: ICD-10-CM

## 2023-02-28 DIAGNOSIS — E11.9 TYPE 2 DIABETES MELLITUS WITHOUT RETINOPATHY (H): Primary | ICD-10-CM

## 2023-02-28 LAB
CHOLEST SERPL-MCNC: 189 MG/DL
HBA1C MFR BLD: 7.1 % (ref 0–5.6)
HDLC SERPL-MCNC: 77 MG/DL
HOLD SPECIMEN: NORMAL
LDLC SERPL CALC-MCNC: 97 MG/DL
NONHDLC SERPL-MCNC: 112 MG/DL
TRIGL SERPL-MCNC: 73 MG/DL

## 2023-02-28 PROCEDURE — 83036 HEMOGLOBIN GLYCOSYLATED A1C: CPT | Performed by: FAMILY MEDICINE

## 2023-02-28 PROCEDURE — 80061 LIPID PANEL: CPT | Performed by: FAMILY MEDICINE

## 2023-02-28 PROCEDURE — 99215 OFFICE O/P EST HI 40 MIN: CPT | Performed by: FAMILY MEDICINE

## 2023-02-28 PROCEDURE — 36415 COLL VENOUS BLD VENIPUNCTURE: CPT | Performed by: FAMILY MEDICINE

## 2023-02-28 RX ORDER — DICLOFENAC SODIUM 75 MG/1
TABLET, DELAYED RELEASE ORAL
Qty: 60 TABLET | Refills: 3 | Status: SHIPPED | OUTPATIENT
Start: 2023-02-28

## 2023-02-28 ASSESSMENT — PAIN SCALES - GENERAL: PAINLEVEL: NO PAIN (0)

## 2023-02-28 NOTE — PATIENT INSTRUCTIONS
Watch diet and exercise  Follow up in 3 months  Dexa and mammo in may of 2023    Stop taking naprosyn for now and take voltran only with tylneol for knee pain    Mammogram Scheduling  [facility] [phone]   [facility] [phone]  Sherrill Rubio D.O.      Patient Education

## 2023-02-28 NOTE — LETTER
February 28, 2023      Ankush Cole  [address]  [place] [address]        To Whom It May Concern:    Ankush Cole  was seen on February 28, 2023.  She has history of DIABETES MELLITUS , arthritis and her daughter, Jass, goes to her mothers apartment helps her with her ADL's. Her daughter asked me to write this letter so with her mother permission she can get a key to her apartment.            Sincerely,            Sherrill Rubio DO

## 2023-02-28 NOTE — PROGRESS NOTES
ICD-10-CM    1. Type 2 diabetes mellitus without retinopathy (H)  E11.9 HEMOGLOBIN A1C      2. Vitamin D deficiency  E55.9       3. Screening for hyperlipidemia  Z13.220 Lipid panel reflex to direct LDL Fasting     Lipid panel reflex to direct LDL Fasting      4. Osteopenia, unspecified location  M85.80 DX Hip/Pelvis/Spine      5. Visit for screening mammogram  Z12.31 MA Screen Bilateral w/William      6. Morbid obesity (H)  E66.01       7. Osteoarthritis, unspecified osteoarthritis type, unspecified site  M19.90 diclofenac (VOLTAREN) 75 MG EC tablet      Type 2 diabetes-worsened slightly patient declines medication for now.  We will change her diet and exercise and recheck in 3 months.    Osteopenia patient is taking vitamin D advise rechecking DEXA scan    Osteoarthritis patient is doing well on current medication discussed that she should not be taking a Kaplan and Naprosyn at the same time due to increased risk of interaction and GI side effects.  She reports that she rarely uses both medication at the same time    Social-patient is going to  program and is living in a [facility] that is income based.  Daughter asked to get a letter to get her extra keys for her mom.  I did place a letter    mammo to be done    Spent  45 min greater than 50% of counseling and coordination of care for the conditions documented above.    Lashae Lechuga is a 72 year old, presenting for the following health issues:  Chronic Disease Management    DECLINES COVID Booster     History of Present Illness       Diabetes:   She presents for follow up of diabetes.  She is checking home blood glucose a few times a month. She checks blood glucose before meals.  Blood glucose is never over 200 and never under 70. When her blood glucose is low, the patient is asymptomatic for confusion, blurred vision, lethargy and reports not feeling dizzy, shaky, or weak.  She has no concerns regarding her diabetes at this time.  She is  "not experiencing numbness or burning in feet, excessive thirst, blurry vision, weight changes or redness, sores or blisters on feet.         She eats 4 or more servings of fruits and vegetables daily.She consumes 1 sweetened beverage(s) daily.She exercises with enough effort to increase her heart rate 20 to 29 minutes per day.  She exercises with enough effort to increase her heart rate 5 days per week.   She is taking medications regularly.           Review of Systems   Constitutional, HEENT, cardiovascular, pulmonary, GI, , musculoskeletal, neuro, skin, endocrine and psych systems are negative, except as otherwise noted.      Objective    /72   Pulse 62   Temp 98  F (36.7  C) (Oral)   Resp 16   Ht 1.549 m (5' 1\")   Wt 86.2 kg (190 lb)   SpO2 100%   BMI 35.90 kg/m    Body mass index is 35.9 kg/m .  Physical Exam   GENERAL: healthy, alert and no distress  NECK: no adenopathy, no asymmetry, masses, or scars and thyroid normal to palpation  RESP: lungs clear to auscultation - no rales, rhonchi or wheezes  CV: regular rate and rhythm, normal S1 S2, no S3 or S4, no murmur, click or rub, no peripheral edema and peripheral pulses strong  ABDOMEN: soft, nontender, no hepatosplenomegaly, no masses and bowel sounds normal  MS: no gross musculoskeletal defects noted, no edema                    " 02-Jul-2019

## 2023-03-05 NOTE — RESULT ENCOUNTER NOTE
All your  other results are essentially stable Please contact me if you have any questions.  Take care,  Sherrill Rubio D.O.

## 2023-04-11 ENCOUNTER — PATIENT OUTREACH (OUTPATIENT)
Dept: GERIATRIC MEDICINE | Facility: CLINIC | Age: 73
End: 2023-04-11
Payer: COMMERCIAL

## 2023-04-11 NOTE — LETTER
April 11, 2023    ANKUSH PRIETO  [address]  [place] [facility] [address]      Dear Ankush:    As a member of [employer] [facility] you are provided a care coordinator. I will be your new care coordinator as of 4/1/2023. I will be calling you soon to see how you are doing and determine your needs.    If you have any questions, please feel free to call me at [phone]. If you reach my voice mail, please leave a message and your phone number. If you are hearing impaired, please call the [place] [employer] at [phone] or [phone] ([email or web address] relay service).    I look forward to speaking with you soon.    Sincerely,        Mily Sutton RN, BSN, N  [phone]  [email or web address]          [facility]  [id number] DHS Approved ([id number])  [id number] (11/18)

## 2023-04-11 NOTE — PROGRESS NOTES
[facility] Care Coordination Contact    Member became effective with  [facility] on 4/1/2023 with [facility]  Previous Health Plan: [facility]  Previous Care System: [facility]  Previous care coordinators name and number: Leatha De La Cruz, LSW  [phone]  Waiver Type: EW  Last MMIS Entry: Date 1/17/23 and Type REASSMT  MMIS visit date (and type) if different from above: n/a  Services Listed in MMIS:   33 F CASE MGMT 18 F DAY SVC A1 F IND PATY SP  04 O HOME MEALS  UTF received: Yes: Received and saved to member file  Address/Phone discrepancy: n/a    Milena Edmonds  Case Management Specialist  [facility]  [phone]

## 2023-04-18 ENCOUNTER — PATIENT OUTREACH (OUTPATIENT)
Dept: GERIATRIC MEDICINE | Facility: CLINIC | Age: 73
End: 2023-04-18
Payer: COMMERCIAL

## 2023-04-18 NOTE — PROGRESS NOTES
[facility] Care Coordination Contact    [facility] Change (Transfer)    Member is new enrollee to [facility] effective 4/1/23 with [facility] health plan. Member transferred from [facility].    No home visit required because this care coordinator (CC) has received all required documentation from the previous CC.    Writer t/c to member, introduced self as member's new CC. Confirmed with member that the welcome letter with writer's name and contact information has been received.  Reviewed LTCC/Health Risk Assessment (HRA) and POC with member. No changes noted.  Transitional HRA completed. Care Plan Summary updated and reflects current services.  Required referral authorization information communicated to CMS: Yes    Writer reviewed the following with member:    ER visits: No  Hospitalizations: No  TCU stays: No  Significant health status changes: None  Falls/Injuries: No  ADL/IADL changes: No  Changes in services: Yes: Per dgt, ADC waiting on new CC to send them documents to start the PCA/HMk services. CC explained that per UTF note, ICLS and ADC was the only services. Explained what ICLS services. Dgt verb understanding and confirmed it's the services member needs. Dgt reports, completing tasks including shopping, meal preparation, and  and transportation. Reports that granddgt already trained with agency and agency is waiting for authorization from CC. Reviewed HCD, will send out a copy.   Called to [facility]. Provided CC title and contact information. Confirmed regarding ICLS services.  Per staff, granddgt did completed training for PCA/HMK. CC explained that services was given for ICLS not PCA/HMK.  Per staff, does not have ICLS services.  Left dgt a voice mail regarding note above.     Follow-Up Plan: Member informed of future contact, plan to f/u with member with at next regularly scheduled contact.  Contact information shared with  member and family, encouraged member to call with any questions or concerns.    Tasked CMS.   Mily Sutton RN  [facility]  [phone]

## 2023-05-19 ENCOUNTER — PATIENT OUTREACH (OUTPATIENT)
Dept: GERIATRIC MEDICINE | Facility: CLINIC | Age: 73
End: 2023-05-19
Payer: COMMERCIAL

## 2023-05-20 NOTE — PROGRESS NOTES
[facility] Care Coordination Contact      [facility] Six-Month Telephone Assessment    6 month telephone assessment completed on 5/19/23.    ER visits: No  Hospitalizations: No  TCU stays: No  Significant health status changes: No  Falls/Injuries: No  ADL/IADL changes: No  Changes in services: Yes: family misunderstood about the ICLS program from assessment with previous CC. Per dgt, granddgt went through the PCA and hmk application and training with [facility]. Wish to get PCA and HMK services. Explained the report from previous CC, member does not have enough hours for PCA and HMk so that is why member agreed on ICLS. Asked if member is willing to give up to days of ADC. Member declined. Per dgt, wish to get homemaking for now. CC will process auth request for 3 hours of HMK. Will take acouple days to send auth request since it's a Friday. [employer] will take up to 14 days to precess. Dgt verb understanding.     Called dgt back and left a voice mail. Will send member signature page for member to sign for the new services and send back to CC.     Caregiver Assessment follow up:  na    Goals: See POC in chart for goal progress documentation. No changes.    Will see member in 6 months for an annual health risk assessment.   Encouraged member to call CC with any questions or concerns in the meantime.     Tasked CMS for member sig page and add HCD short form per previous conversation, update LEONARD.     Mily Sutton RN  [facility]  [phone]

## 2023-05-23 NOTE — PROGRESS NOTES
[facility] Care Coordination Contact  Informed [facility] of new services K 3/ week starting 5/29/23.   Staff verb understanding.   Mily Sutton RN  [facility]  [phone]

## 2023-05-23 NOTE — PROGRESS NOTES
Member Signature - POC Change:  Per CC, member has made a change to their POC.  Care Plan Change Letter mailed to member for signature with a self-addressed return envelope.     Provider Signature - No POC Shared:  Member indicates that they do not want their POC shared with any EW providers.     Milena Edmonds  Case Management Specialist  [facility]  [phone]

## 2023-10-16 DIAGNOSIS — E55.9 VITAMIN D DEFICIENCY: Primary | ICD-10-CM

## 2023-10-16 RX ORDER — CHOLECALCIFEROL (VITAMIN D3) 50 MCG
1 TABLET ORAL 2 TIMES DAILY
Qty: 270 TABLET | Refills: 0 | Status: SHIPPED | OUTPATIENT
Start: 2023-10-16 | End: 2024-02-29

## 2023-11-08 ENCOUNTER — ANCILLARY PROCEDURE (OUTPATIENT)
Dept: BONE DENSITY | Facility: CLINIC | Age: 73
End: 2023-11-08
Attending: FAMILY MEDICINE
Payer: COMMERCIAL

## 2023-11-08 DIAGNOSIS — M85.80 OSTEOPENIA, UNSPECIFIED LOCATION: ICD-10-CM

## 2023-11-08 PROCEDURE — 77085 DXA BONE DENSITY AXL VRT FX: CPT | Mod: TC | Performed by: PHYSICIAN ASSISTANT

## 2023-12-12 ENCOUNTER — PATIENT OUTREACH (OUTPATIENT)
Dept: GERIATRIC MEDICINE | Facility: CLINIC | Age: 73
End: 2023-12-12
Payer: COMMERCIAL

## 2023-12-12 NOTE — PROGRESS NOTES
[facility] Care Coordination Contact    Called adult daughter Jass  to schedule annual HRA home visit. HRA has been scheduled for 12/22/23 at 11am.  Mily Sutton RN  [facility]  [phone]

## 2023-12-22 ASSESSMENT — LIFESTYLE VARIABLES
SKIP TO QUESTIONS 9-10: 1
AUDIT-C TOTAL SCORE: 0

## 2023-12-28 ENCOUNTER — PATIENT OUTREACH (OUTPATIENT)
Dept: GERIATRIC MEDICINE | Facility: CLINIC | Age: 73
End: 2023-12-28
Payer: COMMERCIAL

## 2023-12-28 ASSESSMENT — ACTIVITIES OF DAILY LIVING (ADL)
DEPENDENT_IADLS:: CLEANING;COOKING;LAUNDRY;SHOPPING;MEAL PREPARATION;MEDICATION MANAGEMENT;MONEY MANAGEMENT;TRANSPORTATION

## 2024-01-08 DIAGNOSIS — M19.90 OSTEOARTHRITIS, UNSPECIFIED OSTEOARTHRITIS TYPE, UNSPECIFIED SITE: ICD-10-CM

## 2024-01-09 RX ORDER — ACETAMINOPHEN 650 MG/1
650 TABLET, FILM COATED, EXTENDED RELEASE ORAL EVERY 8 HOURS PRN
Qty: 90 TABLET | Refills: 12 | Status: SHIPPED | OUTPATIENT
Start: 2024-01-09

## 2024-01-11 ENCOUNTER — OFFICE VISIT (OUTPATIENT)
Dept: OPTOMETRY | Facility: CLINIC | Age: 74
End: 2024-01-11
Payer: COMMERCIAL

## 2024-01-11 DIAGNOSIS — Z01.01 ENCOUNTER FOR EXAMINATION OF EYES AND VISION WITH ABNORMAL FINDINGS: Primary | ICD-10-CM

## 2024-01-11 DIAGNOSIS — H35.372 EPIRETINAL MEMBRANE (ERM) OF LEFT EYE: ICD-10-CM

## 2024-01-11 DIAGNOSIS — H52.223 REGULAR ASTIGMATISM OF BOTH EYES: ICD-10-CM

## 2024-01-11 DIAGNOSIS — E11.9 TYPE 2 DIABETES MELLITUS WITHOUT RETINOPATHY (H): ICD-10-CM

## 2024-01-11 DIAGNOSIS — H52.4 PRESBYOPIA: ICD-10-CM

## 2024-01-11 DIAGNOSIS — H25.13 NUCLEAR AGE-RELATED CATARACT, BOTH EYES: ICD-10-CM

## 2024-01-11 PROCEDURE — 92014 COMPRE OPH EXAM EST PT 1/>: CPT | Performed by: OPTOMETRIST

## 2024-01-11 PROCEDURE — 92015 DETERMINE REFRACTIVE STATE: CPT | Performed by: OPTOMETRIST

## 2024-01-11 ASSESSMENT — SLIT LAMP EXAM - LIDS
COMMENTS: NORMAL
COMMENTS: NORMAL

## 2024-01-11 ASSESSMENT — VISUAL ACUITY
OD_SC+: -1
OD_SC: 20/100
METHOD: NUMBERS - LINEAR
OS_SC: 20/100
OS_CC: 20/100
CORRECTION_TYPE: GLASSES
OD_SC: 20/800
OD_CC: 20/100
OS_SC: 20/800

## 2024-01-11 ASSESSMENT — REFRACTION_WEARINGRX
OS_CYLINDER: +0.50
OD_AXIS: 170
OD_CYLINDER: +0.50
SPECS_TYPE: READING
OS_SPHERE: +2.75
OD_SPHERE: +3.00
OS_AXIS: 180

## 2024-01-11 ASSESSMENT — REFRACTION_MANIFEST
OS_SPHERE: PLANO
OD_AXIS: 170
OD_CYLINDER: +0.50
OS_AXIS: 180
OD_ADD: +2.75
OS_ADD: +2.75
OS_CYLINDER: +0.50
OD_SPHERE: +0.50

## 2024-01-11 ASSESSMENT — CUP TO DISC RATIO
OS_RATIO: 0.25
OD_RATIO: 0.25

## 2024-01-11 ASSESSMENT — CONF VISUAL FIELD
OD_NORMAL: 1
OD_SUPERIOR_NASAL_RESTRICTION: 0
OS_NORMAL: 1
OS_SUPERIOR_NASAL_RESTRICTION: 0
METHOD: COUNTING FINGERS
OD_SUPERIOR_TEMPORAL_RESTRICTION: 0
OD_INFERIOR_TEMPORAL_RESTRICTION: 0
OD_INFERIOR_NASAL_RESTRICTION: 0
OS_INFERIOR_TEMPORAL_RESTRICTION: 0
OS_INFERIOR_NASAL_RESTRICTION: 0
OS_SUPERIOR_TEMPORAL_RESTRICTION: 0

## 2024-01-11 ASSESSMENT — EXTERNAL EXAM - LEFT EYE: OS_EXAM: NORMAL

## 2024-01-11 ASSESSMENT — TONOMETRY
IOP_METHOD: APPLANATION
OS_IOP_MMHG: 16
OD_IOP_MMHG: 15

## 2024-01-11 ASSESSMENT — EXTERNAL EXAM - RIGHT EYE: OD_EXAM: NORMAL

## 2024-01-11 NOTE — PROGRESS NOTES
Chief Complaint   Patient presents with    Diabetic Eye Exam     Accompanied by daughter/ Jass    Chief Complaint(s) and History of Present Illness(es)       Diabetic Eye Exam              Diabetes Type: Type 2 and controlled with diet    Duration: years    Blood Sugars: is controlled (Checks 1x/day)                   Lab Results   Component Value Date    A1C 7.1 02/28/2023    A1C 6.5 04/20/2022    A1C 6.7 11/04/2020    A1C 6.6 09/04/2019    A1C 6.5 05/02/2018    A1C 6.2 11/21/2017    A1C 6.2 03/24/2017          Last Eye Exam: 10/10/2022  Dilated Previously: Yes, side effects of dilation explained today    What are you currently using to see?  Glasses - Rx reading - wears for everything up close     Distance Vision Acuity: Satisfied with vision    Near Vision Acuity: Not satisfied - harder time reading even with glasses on     Eye Comfort: good  Do you use eye drops? : No  Occupation or Hobbies: Home    Georgia Ochoa  Optometry Assistant     Medical, surgical and family histories reviewed and updated 1/11/2024.       OBJECTIVE: See Ophthalmology exam    ASSESSMENT:    ICD-10-CM    1. Encounter for examination of eyes and vision with abnormal findings  Z01.01       2. Type 2 diabetes mellitus without retinopathy (H)  E11.9       3. Nuclear age-related cataract, both eyes  H25.13       4. Epiretinal membrane (ERM) of left eye  H35.372       5. Regular astigmatism of both eyes  H52.223       6. Presbyopia  H52.4           PLAN:    Ankush Cole aware  eye exam results will be sent to Sherrill Rubio.  Patient Instructions   Patient educated on importance of good blood sugar control.  Letter sent to primary care provider with diabetic eye exam report.     You have the start of mild cataracts.  You may notice some blurred vision or glare with night driving.  It is important that you wear good sunglasses to protect your eyes from the ultraviolet light from the sun.     You have an epiretinal  membrane.  As we grow older, the thick vitreous gel in the middle of our eyes begins to shrink and pull away from the macula. As the vitreous pulls away, scar tissue may develop on the macula. Sometimes the scar tissue can warp and contract, causing the retina to wrinkle or become swollen or distorted.    Reading glasses prescription provided today.     Return in 1 year for a comprehensive eye exam, or sooner if needed.      The effects of the dilating drops last for 4- 6 hours.  You will be more sensitive to light and vision will be blurry up close.  Mydriatic sunglasses were given if needed.     Jakob Pretty, OD  [address]  [address] [phone]

## 2024-01-11 NOTE — PATIENT INSTRUCTIONS
Patient educated on importance of good blood sugar control.  Letter sent to primary care provider with diabetic eye exam report.     You have the start of mild cataracts.  You may notice some blurred vision or glare with night driving.  It is important that you wear good sunglasses to protect your eyes from the ultraviolet light from the sun.     You have an epiretinal membrane.  As we grow older, the thick vitreous gel in the middle of our eyes begins to shrink and pull away from the macula. As the vitreous pulls away, scar tissue may develop on the macula. Sometimes the scar tissue can warp and contract, causing the retina to wrinkle or become swollen or distorted.    Reading glasses prescription provided today.     Return in 1 year for a comprehensive eye exam, or sooner if needed.      The effects of the dilating drops last for 4- 6 hours.  You will be more sensitive to light and vision will be blurry up close.  Mydriatic sunglasses were given if needed.     Jakob Pretty, OD  [facility]  [address] [place] [address]. [place] Jama  [address]    [phone]

## 2024-01-11 NOTE — LETTER
1/11/2024         RE: Ankush Cole  [address]  [place] [address]        Dear Colleague,    Thank you for referring your patient, Ankush Cole, to the [facility]. Please see a copy of my visit note below.    Chief Complaint   Patient presents with     Diabetic Eye Exam     Accompanied by daughter/ Jass    Chief Complaint(s) and History of Present Illness(es)       Diabetic Eye Exam              Diabetes Type: Type 2 and controlled with diet    Duration: years    Blood Sugars: is controlled (Checks 1x/day)                   Lab Results   Component Value Date    A1C 7.1 02/28/2023    A1C 6.5 04/20/2022    A1C 6.7 11/04/2020    A1C 6.6 09/04/2019    A1C 6.5 05/02/2018    A1C 6.2 11/21/2017    A1C 6.2 03/24/2017          Last Eye Exam: 10/10/2022  Dilated Previously: Yes, side effects of dilation explained today    What are you currently using to see?  Glasses - Rx reading - wears for everything up close     Distance Vision Acuity: Satisfied with vision    Near Vision Acuity: Not satisfied - harder time reading even with glasses on     Eye Comfort: good  Do you use eye drops? : No  Occupation or Hobbies: Home    Georgia Ochoa  Optometry Assistant     Medical, surgical and family histories reviewed and updated 1/11/2024.       OBJECTIVE: See Ophthalmology exam    ASSESSMENT:    ICD-10-CM    1. Encounter for examination of eyes and vision with abnormal findings  Z01.01       2. Type 2 diabetes mellitus without retinopathy (H)  E11.9       3. Nuclear age-related cataract, both eyes  H25.13       4. Epiretinal membrane (ERM) of left eye  H35.372       5. Regular astigmatism of both eyes  H52.223       6. Presbyopia  H52.4           PLAN:    Ankush Cole aware  eye exam results will be sent to Sherrill Rubio.  Patient Instructions   Patient educated on importance of good blood sugar control.  Letter sent to primary care provider with  diabetic eye exam report.     You have the start of mild cataracts.  You may notice some blurred vision or glare with night driving.  It is important that you wear good sunglasses to protect your eyes from the ultraviolet light from the sun.     You have an epiretinal membrane.  As we grow older, the thick vitreous gel in the middle of our eyes begins to shrink and pull away from the macula. As the vitreous pulls away, scar tissue may develop on the macula. Sometimes the scar tissue can warp and contract, causing the retina to wrinkle or become swollen or distorted.    Reading glasses prescription provided today.     Return in 1 year for a comprehensive eye exam, or sooner if needed.      The effects of the dilating drops last for 4- 6 hours.  You will be more sensitive to light and vision will be blurry up close.  Mydriatic sunglasses were given if needed.     Jakob Pretty, KELSIE  [address] [place]  [address]    [phone]             Again, thank you for allowing me to participate in the care of your patient.        Sincerely,        Jakob Pretty OD

## 2024-01-16 ENCOUNTER — TELEPHONE (OUTPATIENT)
Dept: FAMILY MEDICINE | Facility: CLINIC | Age: 74
End: 2024-01-16
Payer: COMMERCIAL

## 2024-01-16 NOTE — TELEPHONE ENCOUNTER
Tc called spoke with patient, advised to really trying to keep appointment if possible as  will be out of  office till March with next opening roughly around 3/20/2024.    Pt states she will keep appt for now and try to see if her work will give her time. TC did advise that if patient can not keep appt to please call.      Lisa Mills    [facility]

## 2024-01-16 NOTE — TELEPHONE ENCOUNTER
Reason for Call:  Appointment Request    Patient requesting this type of appt:  Preventive     Requested provider: Sherrill Rubio    Reason patient unable to be scheduled: Not within requested timeframe    When does patient want to be seen/preferred time:  this next month    Comments: Pt daughter would like to resched 1/23 physical for a different date, preferably within this next month. 1/25 - 2/2 in the AM , if possible.     Okay to leave a detailed message?: Yes at Cell number on file:    Telephone Information:   Mobile [phone]       Call taken on 1/16/2024 at 3:12 PM by Angelica Schuler

## 2024-01-17 NOTE — PROGRESS NOTES
[facility] Care Coordination Contact    [facility] Home Visit Assessment     Home visit for Health Risk Assessment with Ankush Cole completed on 2023.    Type of residence:: Apartment - handicap accessible  Current living arrangement:: I live alone     Assessment completed with:: Patient, Family    Current Care Plan  Member currently receiving the following home care services:     Member currently receiving the following community resources: Day Care, TANF (Temp Assist for Needing Families), Transportation Services, home making      Medication Review  Medication reconciliation completed in Epic: Yes  Medication set-up & administration: Independent-does not set up.  Self-administers medications.  Medication Risk Assessment Medication (1 or more, place referral to MTM): N/A: No risk factors identified  MTM Referral Placed: No: No risk factors idenified    Mental/Behavioral Health   Depression Screenin    Mental health DX:: No        Falls Assessment:   Fallen 2 or more times in the past year?: No   Any fall with injury in the past year?: No    ADL/IADL Dependencies:   Dependent ADLs:: Bathing  Dependent IADLs:: Cleaning, Cooking, Laundry, Shopping, Meal Preparation, Medication Management, Money Management, Transportation    Health Plan sponsored benefits: are MSC+: Shared information regarding preventative health screening and health plan supplemental benefits/incentives. Reviewed medication disposal form.    PCA Assessment completed at visit: Not Applicable     Elderly Waiver Eligibility: Yes-will continue on EW    Care Plan & Recommendations: Member wish to remain living in the community with ADC and homemaking services. Offered Meal services, member declined due to only eats cultural food. Last A1C was 2023 with 7.1 results. Per daughter, member is bordline only. CC explained reading and ranges that 7.1 falls under diabetic not bordline. Encourage daughter to discuss  diabetic management with PCP. Daughter verb understanding.   Member's goal is to complete a wellness check up, completed dental screening and eye exam and manage her pain. Member does not wish to have a goal for diabetes.    See [facility] for detailed assessment information.    Follow-Up Plan: Member informed of future contact, plan to f/u with member with a 6 month telephone assessment.  Contact information shared with member and family, encouraged member to call with any questions or concerns at any time.    [facility] care continuum providers: Please see Snapshot and Care Management Flowsheets for Specific details of care plan.    This CC note routed to PCP, Sherrill Rubio.    Tasked CMS EOV.   Mily Sutton RN  [facility]  [phone]

## 2024-01-18 ENCOUNTER — PATIENT OUTREACH (OUTPATIENT)
Dept: GERIATRIC MEDICINE | Facility: CLINIC | Age: 74
End: 2024-01-18
Payer: COMMERCIAL

## 2024-01-18 NOTE — PROGRESS NOTES
[facility] Care Coordination Contact    Received after visit chart from care coordinator.  Completed following tasks: Mailed copy of care plan/support plan to member, Mailed Safe Medication Disposal , Submitted referrals/auths for hmkg, adc w/rides, Updated services in Database, and Requested new Health Saving Card and Go To Pass   and Provider Signature - No POC Shared:  Member indicates that they do not want their POC shared with any EW providers.    Milena Edmonds  Case Management Specialist  [facility]  [phone]

## 2024-01-18 NOTE — LETTER
January 18, 2024    ANKUSH PRIETO  [address]  [place] [address]        Dear Ankush:    At [facility], we re dedicated to improving your health and wellness. Enclosed is the Care Plan developed with you on 12/22/2023. Please review the Care Plan carefully.    As a reminder, during your visit we talked about:  Ways to manage your physical and mental health  Using health care to maintain and improve your health   Your preventive care needs     Remember to contact your care coordinator if you:  Are hospitalized, or plan to be hospitalized   Have a fall    Have a change in your physical or mental health  Need help finding support or services    If you have questions, or don t agree with your Care Plan, call me at [phone]. You can also call me if your needs change. TTY users, call the [place] [employer] at (931) or 1-896.334.9013 (ublqcv-ap-krtspx relay service).    Sincerely,        Mily Sutton RN, BSN, PHN  [phone]  [email or web address]    [id number]    [id number] (07/2022)

## 2024-01-19 ENCOUNTER — APPOINTMENT (OUTPATIENT)
Dept: OPTOMETRY | Facility: CLINIC | Age: 74
End: 2024-01-19
Payer: COMMERCIAL

## 2024-01-19 PROCEDURE — 92340 FIT SPECTACLES MONOFOCAL: CPT | Performed by: OPTOMETRIST

## 2024-01-23 ENCOUNTER — OFFICE VISIT (OUTPATIENT)
Dept: FAMILY MEDICINE | Facility: CLINIC | Age: 74
End: 2024-01-23
Payer: COMMERCIAL

## 2024-01-23 VITALS
HEIGHT: 61 IN | HEART RATE: 70 BPM | BODY MASS INDEX: 34.74 KG/M2 | SYSTOLIC BLOOD PRESSURE: 124 MMHG | OXYGEN SATURATION: 100 % | WEIGHT: 184 LBS | RESPIRATION RATE: 16 BRPM | TEMPERATURE: 98 F | DIASTOLIC BLOOD PRESSURE: 74 MMHG

## 2024-01-23 DIAGNOSIS — Z13.220 LIPID SCREENING: ICD-10-CM

## 2024-01-23 DIAGNOSIS — E11.9 TYPE 2 DIABETES MELLITUS WITHOUT RETINOPATHY (H): ICD-10-CM

## 2024-01-23 DIAGNOSIS — E66.01 MORBID OBESITY (H): ICD-10-CM

## 2024-01-23 DIAGNOSIS — E55.9 VITAMIN D DEFICIENCY: ICD-10-CM

## 2024-01-23 DIAGNOSIS — Z00.00 ENCOUNTER FOR MEDICARE ANNUAL WELLNESS EXAM: Primary | ICD-10-CM

## 2024-01-23 DIAGNOSIS — M17.9 OSTEOARTHRITIS OF KNEE, UNSPECIFIED LATERALITY, UNSPECIFIED OSTEOARTHRITIS TYPE: ICD-10-CM

## 2024-01-23 DIAGNOSIS — Z13.29 SCREENING FOR THYROID DISORDER: ICD-10-CM

## 2024-01-23 LAB
ALBUMIN SERPL BCG-MCNC: 4.2 G/DL (ref 3.5–5.2)
ALP SERPL-CCNC: 61 U/L (ref 40–150)
ALT SERPL W P-5'-P-CCNC: 18 U/L (ref 0–50)
ANION GAP SERPL CALCULATED.3IONS-SCNC: 10 MMOL/L (ref 7–15)
AST SERPL W P-5'-P-CCNC: 18 U/L (ref 0–45)
BASOPHILS # BLD AUTO: 0 10E3/UL (ref 0–0.2)
BASOPHILS NFR BLD AUTO: 1 %
BILIRUB SERPL-MCNC: 0.4 MG/DL
BUN SERPL-MCNC: 12.2 MG/DL (ref 8–23)
CALCIUM SERPL-MCNC: 10 MG/DL (ref 8.8–10.2)
CHLORIDE SERPL-SCNC: 103 MMOL/L (ref 98–107)
CHOLEST SERPL-MCNC: 198 MG/DL
CREAT SERPL-MCNC: 0.67 MG/DL (ref 0.51–0.95)
CREAT UR-MCNC: 72.6 MG/DL
DEPRECATED HCO3 PLAS-SCNC: 28 MMOL/L (ref 22–29)
EGFRCR SERPLBLD CKD-EPI 2021: >90 ML/MIN/1.73M2
EOSINOPHIL # BLD AUTO: 0.2 10E3/UL (ref 0–0.7)
EOSINOPHIL NFR BLD AUTO: 2 %
ERYTHROCYTE [DISTWIDTH] IN BLOOD BY AUTOMATED COUNT: 13.5 % (ref 10–15)
GLUCOSE SERPL-MCNC: 128 MG/DL (ref 70–99)
HBA1C MFR BLD: 7.1 % (ref 0–5.6)
HCT VFR BLD AUTO: 41.4 % (ref 35–47)
HDLC SERPL-MCNC: 88 MG/DL
HGB BLD-MCNC: 12.9 G/DL (ref 11.7–15.7)
HOLD SPECIMEN: NORMAL
IMM GRANULOCYTES # BLD: 0 10E3/UL
IMM GRANULOCYTES NFR BLD: 0 %
LDLC SERPL CALC-MCNC: 98 MG/DL
LYMPHOCYTES # BLD AUTO: 2.8 10E3/UL (ref 0.8–5.3)
LYMPHOCYTES NFR BLD AUTO: 35 %
MCH RBC QN AUTO: 28 PG (ref 26.5–33)
MCHC RBC AUTO-ENTMCNC: 31.2 G/DL (ref 31.5–36.5)
MCV RBC AUTO: 90 FL (ref 78–100)
MICROALBUMIN UR-MCNC: <12 MG/L
MICROALBUMIN/CREAT UR: NORMAL MG/G{CREAT}
MONOCYTES # BLD AUTO: 0.8 10E3/UL (ref 0–1.3)
MONOCYTES NFR BLD AUTO: 10 %
NEUTROPHILS # BLD AUTO: 4.3 10E3/UL (ref 1.6–8.3)
NEUTROPHILS NFR BLD AUTO: 53 %
NONHDLC SERPL-MCNC: 110 MG/DL
PLATELET # BLD AUTO: 264 10E3/UL (ref 150–450)
POTASSIUM SERPL-SCNC: 4.2 MMOL/L (ref 3.4–5.3)
PROT SERPL-MCNC: 7.6 G/DL (ref 6.4–8.3)
RBC # BLD AUTO: 4.61 10E6/UL (ref 3.8–5.2)
SODIUM SERPL-SCNC: 141 MMOL/L (ref 135–145)
TRIGL SERPL-MCNC: 58 MG/DL
TSH SERPL DL<=0.005 MIU/L-ACNC: 0.92 UIU/ML (ref 0.3–4.2)
VIT D+METAB SERPL-MCNC: 52 NG/ML (ref 20–50)
WBC # BLD AUTO: 8 10E3/UL (ref 4–11)

## 2024-01-23 PROCEDURE — 83036 HEMOGLOBIN GLYCOSYLATED A1C: CPT | Performed by: FAMILY MEDICINE

## 2024-01-23 PROCEDURE — 99214 OFFICE O/P EST MOD 30 MIN: CPT | Mod: 25 | Performed by: FAMILY MEDICINE

## 2024-01-23 PROCEDURE — 36415 COLL VENOUS BLD VENIPUNCTURE: CPT | Performed by: FAMILY MEDICINE

## 2024-01-23 PROCEDURE — 82570 ASSAY OF URINE CREATININE: CPT | Performed by: FAMILY MEDICINE

## 2024-01-23 PROCEDURE — 80053 COMPREHEN METABOLIC PANEL: CPT | Performed by: FAMILY MEDICINE

## 2024-01-23 PROCEDURE — 90662 IIV NO PRSV INCREASED AG IM: CPT | Performed by: FAMILY MEDICINE

## 2024-01-23 PROCEDURE — 82306 VITAMIN D 25 HYDROXY: CPT | Performed by: FAMILY MEDICINE

## 2024-01-23 PROCEDURE — 99397 PER PM REEVAL EST PAT 65+ YR: CPT | Mod: 25 | Performed by: FAMILY MEDICINE

## 2024-01-23 PROCEDURE — 82043 UR ALBUMIN QUANTITATIVE: CPT | Performed by: FAMILY MEDICINE

## 2024-01-23 PROCEDURE — 90471 IMMUNIZATION ADMIN: CPT | Performed by: FAMILY MEDICINE

## 2024-01-23 PROCEDURE — 80061 LIPID PANEL: CPT | Performed by: FAMILY MEDICINE

## 2024-01-23 PROCEDURE — 84443 ASSAY THYROID STIM HORMONE: CPT | Performed by: FAMILY MEDICINE

## 2024-01-23 PROCEDURE — 85025 COMPLETE CBC W/AUTO DIFF WBC: CPT | Performed by: FAMILY MEDICINE

## 2024-01-23 ASSESSMENT — ENCOUNTER SYMPTOMS
HEARTBURN: 0
PALPITATIONS: 0
DYSURIA: 0
ARTHRALGIAS: 1
FREQUENCY: 0
HEADACHES: 0
SHORTNESS OF BREATH: 0
NAUSEA: 0
JOINT SWELLING: 1
EYE PAIN: 0
DIARRHEA: 0
ABDOMINAL PAIN: 0
NERVOUS/ANXIOUS: 0
CONSTIPATION: 0
MYALGIAS: 0
FEVER: 0
COUGH: 0
PARESTHESIAS: 0
BREAST MASS: 0
HEMATURIA: 0
WEAKNESS: 0
CHILLS: 0
SORE THROAT: 0
HEMATOCHEZIA: 0
DIZZINESS: 0

## 2024-01-23 ASSESSMENT — ACTIVITIES OF DAILY LIVING (ADL)
CURRENT_FUNCTION: HOUSEWORK REQUIRES ASSISTANCE
CURRENT_FUNCTION: SHOPPING REQUIRES ASSISTANCE
CURRENT_FUNCTION: PREPARING MEALS REQUIRES ASSISTANCE
CURRENT_FUNCTION: MEDICATION ADMINISTRATION REQUIRES ASSISTANCE
CURRENT_FUNCTION: LAUNDRY REQUIRES ASSISTANCE
CURRENT_FUNCTION: TRANSPORTATION REQUIRES ASSISTANCE
CURRENT_FUNCTION: BATHING REQUIRES ASSISTANCE

## 2024-01-23 ASSESSMENT — PAIN SCALES - GENERAL: PAINLEVEL: NO PAIN (0)

## 2024-01-23 NOTE — PROGRESS NOTES
"Preventive Care Visit  [facility]  Sherrill Rubio DO, Family Medicine  Jan 23, 2024      SUBJECTIVE:   Ankush is a 73 year old, presenting for the following:  Wellness Visit      Are you in the first 12 months of your Medicare coverage?  No    Healthy Habits:     In general, how would you rate your overall health?  Good    Frequency of exercise:  4-5 days/week    Duration of exercise:  15-30 minutes    Do you usually eat at least 4 servings of fruit and vegetables a day, include whole grains    & fiber and avoid regularly eating high fat or \"junk\" foods?  Yes    Taking medications regularly:  Yes    Medication side effects:  None    Ability to successfully perform activities of daily living:  Transportation requires assistance, shopping requires assistance, preparing meals requires assistance, housework requires assistance, bathing requires assistance, laundry requires assistance and medication administration requires assistance    Home Safety:  No safety concerns identified    Hearing Impairment:  No hearing concerns    In the past 6 months, have you been bothered by leaking of urine?  No    In general, how would you rate your overall mental or emotional health?  Excellent    Additional concerns today:  No          Have you ever done Advance Care Planning? (For example, a Health Directive, POLST, or a discussion with a medical provider or your loved ones about your wishes): No, advance care planning information given to patient to review.  Patient plans to discuss their wishes with loved ones or provider.         Fall risk  Fallen 2 or more times in the past year?: No  Any fall with injury in the past year?: No    Cognitive Screening Not appropriate due to known language barrier    Do you have sleep apnea, excessive snoring or daytime drowsiness? : no    Reviewed and updated as needed this visit by clinical staff   Tobacco  Allergies  Meds              Reviewed and updated " as needed this visit by Provider                  Social History     Tobacco Use    Smoking status: Never    Smokeless tobacco: Never   Substance Use Topics    Alcohol use: No             1/23/2024     9:12 AM   Alcohol Use   Prescreen: >3 drinks/day or >7 drinks/week? No     Do you have a current opioid prescription? No  Do you use any other controlled substances or medications that are not prescribed by a provider? None        Diabetes Follow-up    How often are you checking your blood sugar? One time daily  What time of day are you checking your blood sugars (select all that apply)?  Before and after meals  Have you had any blood sugars above 200?  No  Have you had any blood sugars below 70?  No  What symptoms do you notice when your blood sugar is low?  None  What concerns do you have today about your diabetes? None   Do you have any of these symptoms? (Select all that apply)  No numbness or tingling in feet.  No redness, sores or blisters on feet.  No complaints of excessive thirst.  No reports of blurry vision.  No significant changes to weight.      BP Readings from Last 2 Encounters:   01/23/24 124/74   02/28/23 128/72     Hemoglobin A1C (%)   Date Value   01/23/2024 7.1 (H)   02/28/2023 7.1 (H)   11/04/2020 6.7 (H)   09/04/2019 6.6 (H)     LDL Cholesterol Calculated (mg/dL)   Date Value   02/28/2023 97   09/04/2019 128 (H)   03/24/2017 102 (H)           Current providers sharing in care for this patient include:   Patient Care Team:  Sherrill Rubio DO as PCP - General (Family Practice)  Sherrill Rubio DO as Assigned PCP  Suzette Blanc MD as MD (Student in organized health care education/training program)  Jakob Pretty OD as Assigned Surgical Provider  Mily Sutton RN as Lead Care Coordinator (Primary Care - CC)    The following health maintenance items are reviewed in Epic and correct as of today:  Health Maintenance   Topic Date Due    RSV VACCINE (Pregnancy & 60+) (1 -  1-dose 60+ series) Never done    BMP  09/04/2020    MEDICARE ANNUAL WELLNESS VISIT  04/20/2023    MICROALBUMIN  04/20/2023    DIABETIC FOOT EXAM  04/20/2023    INFLUENZA VACCINE (1) 09/01/2023    COVID-19 Vaccine (4 - 2023-24 season) 09/01/2023    LIPID  02/28/2024    MAMMO SCREENING  05/18/2024    A1C  07/23/2024    DTAP/TDAP/TD IMMUNIZATION (2 - Td or Tdap) 09/18/2024    EYE EXAM  01/11/2025    ANNUAL REVIEW OF HM ORDERS  01/23/2025    FALL RISK ASSESSMENT  01/23/2025    COLORECTAL CANCER SCREENING  03/22/2027    ADVANCE CARE PLANNING  01/23/2029    DEXA  11/08/2038    HEPATITIS C SCREENING  Completed    PHQ-2 (once per calendar year)  Completed    Pneumococcal Vaccine: 65+ Years  Completed    ZOSTER IMMUNIZATION  Completed    IPV IMMUNIZATION  Aged Out    HPV IMMUNIZATION  Aged Out    MENINGITIS IMMUNIZATION  Aged Out    RSV MONOCLONAL ANTIBODY  Aged Out     Lab work is in process      Mammogram Screening - Alternate mammogram schedule due to breast cancer history    Pertinent mammograms are reviewed under the imaging tab.  Review of Systems   Constitutional:  Negative for chills and fever.   HENT:  Negative for congestion, ear pain, hearing loss and sore throat.    Eyes:  Negative for pain and visual disturbance.   Respiratory:  Negative for cough and shortness of breath.    Cardiovascular:  Negative for chest pain and palpitations.   Gastrointestinal:  Negative for abdominal pain, constipation, diarrhea and nausea.   Genitourinary:  Negative for dysuria, frequency, genital sores, hematuria, pelvic pain, urgency, vaginal bleeding and vaginal discharge.   Musculoskeletal:  Positive for arthralgias and joint swelling. Negative for myalgias.   Skin:  Negative for rash.   Neurological:  Negative for dizziness, weakness and headaches.   Psychiatric/Behavioral:  The patient is not nervous/anxious.         Review of Systems  Constitutional, HEENT, cardiovascular, pulmonary, GI, , musculoskeletal, neuro, skin,  "endocrine and psych systems are negative, except as otherwise noted.    OBJECTIVE:   /74   Pulse 70   Temp 98  F (36.7  C) (Oral)   Resp 16   Ht 1.549 m (5' 1\")   Wt 83.5 kg (184 lb)   SpO2 100%   BMI 34.77 kg/m     Estimated body mass index is 34.77 kg/m  as calculated from the following:    Height as of this encounter: 1.549 m (5' 1\").    Weight as of this encounter: 83.5 kg (184 lb).  Physical Exam  GENERAL: alert and no distress  EYES: Eyes grossly normal to inspection, PERRL and conjunctivae and sclerae normal  HENT: ear canals and TM's normal, nose and mouth without ulcers or lesions  NECK: no adenopathy, no asymmetry, masses, or scars  RESP: lungs clear to auscultation - no rales, rhonchi or wheezes  BREAST: normal without masses, tenderness or nipple discharge and no palpable axillary masses or adenopathy  CV: regular rate and rhythm, normal S1 S2, no S3 or S4, no murmur, click or rub, no peripheral edema  ABDOMEN: soft, nontender, no hepatosplenomegaly, no masses and bowel sounds normal  MS: no gross musculoskeletal defects noted, no edema  SKIN: no suspicious lesions or rashes  NEURO: Normal strength and tone, mentation intact and speech normal  PSYCH: mentation appears normal, affect normal/bright  LYMPH: no cervical, supraclavicular, axillary, or inguinal adenopathy    Diagnostic Test Results:  Labs reviewed in Epic    ASSESSMENT / PLAN:   1. Encounter for Medicare annual wellness exam    - CBC with platelets and differential; Future  - CBC with platelets and differential    2. Type 2 diabetes mellitus without retinopathy (H)  Diet controlled, advised diet and lifestyle changes.  Recheck in 3 months  - Albumin Random Urine Quantitative with Creat Ratio  - HEMOGLOBIN A1C  - Comprehensive metabolic panel (BMP + Alb, Alk Phos, ALT, AST, Total. Bili, TP); Future  - CBC with platelets and differential; Future  - CBC with platelets and differential  - Comprehensive metabolic panel (BMP + Alb, Alk " "Phos, ALT, AST, Total. Bili, TP)    3. Vitamin D deficiency  Advised continue vit d   - Vitamin D Deficiency; Future  - Vitamin D Deficiency    4. Osteoarthritis of knee, unspecified laterality, unspecified osteoarthritis type  Stable on current treatment, has had injections in the past but in the last few months.  Continue current treatment     5. Obesity (BMI 35.0-39.9) with comorbidity (H)  Continue dietary change    6. Lipid screening  stable  - Lipid panel reflex to direct LDL Fasting; Future  - Lipid panel reflex to direct LDL Fasting    7. Screening for thyroid disorder    - TSH with free T4 reflex; Future  - TSH with free T4 reflex        Counseling  Reviewed preventive health counseling, as reflected in patient instructions       Regular exercise       Healthy diet/nutrition       Vision screening       Hearing screening       Dental care      BMI  Estimated body mass index is 34.77 kg/m  as calculated from the following:    Height as of this encounter: 1.549 m (5' 1\").    Weight as of this encounter: 83.5 kg (184 lb).         She reports that she has never smoked. She has never used smokeless tobacco.      Appropriate preventive services were discussed with this patient, including applicable screening as appropriate for fall prevention, nutrition, physical activity, Tobacco-use cessation, weight loss and cognition.  Checklist reviewing preventive services available has been given to the patient.    Reviewed patients plan of care and provided an AVS. The Basic Care Plan (routine screening as documented in Health Maintenance) and Intermediate Care Plan ( asthma action plan, low back pain action plan, and migraine action plan) for Ankush meets the Care Plan requirement. This Care Plan has been established and reviewed with the Patient.        Signed Electronically by: Sherrill Rubio DO    Identified Health Risks  I have reviewed Opioid Use Disorder and Substance Use Disorder risk factors and made " any needed referrals.   The patient reports that she has difficulty with activities of daily living. I have asked that the patient make a follow up appointment in    weeks where this issue will be further evaluated and addressed.

## 2024-01-23 NOTE — PATIENT INSTRUCTIONS
Mamogram  Flu today   Covid shot at the pharamcy  RSV-at the pharamacy  Hep A ,you are immune , Hep B non immunity, would recommend shots(3)    Follow up with me in April please  Take care  Sherrill Rubio D.O.    Patient Education   Personalized Prevention Plan  You are due for the preventive services outlined below.  Your care team is available to assist you in scheduling these services.  If you have already completed any of these items, please share that information with your care team to update in your medical record.  Health Maintenance Due   Topic Date Due    RSV VACCINE (Pregnancy & 60+) (1 - 1-dose 60+ series) Never done    Basic Metabolic Panel  09/04/2020    Annual Wellness Visit  04/20/2023    Kidney Microalbumin Urine Test  04/20/2023    Diabetic Foot Exam  04/20/2023    Flu Vaccine (1) 09/01/2023    COVID-19 Vaccine (4 - 2023-24 season) 09/01/2023     Activities of Daily Living    Your Health Risk Assessment indicates you have difficulties with activities of daily living such as housework, bathing, preparing meals, taking medication, etc. Please make a follow up appointment for us to address this issue in more detail.

## 2024-01-24 NOTE — RESULT ENCOUNTER NOTE
All your results are essentially stable,  Please contact me if you have any questions.  Take care,  Sherrill Rubio D.O.

## 2024-02-01 DIAGNOSIS — E11.9 TYPE 2 DIABETES MELLITUS WITHOUT COMPLICATION, WITHOUT LONG-TERM CURRENT USE OF INSULIN (H): ICD-10-CM

## 2024-02-01 RX ORDER — BLOOD SUGAR DIAGNOSTIC
STRIP MISCELLANEOUS
Qty: 100 STRIP | Refills: 3 | Status: SHIPPED | OUTPATIENT
Start: 2024-02-01 | End: 2024-06-24

## 2024-02-20 ENCOUNTER — TELEPHONE (OUTPATIENT)
Dept: FAMILY MEDICINE | Facility: CLINIC | Age: 74
End: 2024-02-20
Payer: COMMERCIAL

## 2024-02-20 NOTE — TELEPHONE ENCOUNTER
Forms received from Replaced by [facility]/ Initial-Annual Physician Letter for Sherrill Rubio DO.  Forms placed in provider 'sign me' folder.  Please fax forms to [phone] after completion.    TY Brown  [facility]

## 2024-02-29 DIAGNOSIS — E55.9 VITAMIN D DEFICIENCY: ICD-10-CM

## 2024-02-29 RX ORDER — CHOLECALCIFEROL (VITAMIN D3) 50 MCG
1 TABLET ORAL 2 TIMES DAILY
Qty: 180 TABLET | Refills: 0 | Status: SHIPPED | OUTPATIENT
Start: 2024-02-29 | End: 2024-04-23

## 2024-04-23 DIAGNOSIS — E55.9 VITAMIN D DEFICIENCY: ICD-10-CM

## 2024-04-23 RX ORDER — CHOLECALCIFEROL (VITAMIN D3) 50 MCG
1 TABLET ORAL 2 TIMES DAILY
Qty: 180 TABLET | Refills: 0 | Status: SHIPPED | OUTPATIENT
Start: 2024-04-23

## 2024-06-24 ENCOUNTER — OFFICE VISIT (OUTPATIENT)
Dept: FAMILY MEDICINE | Facility: CLINIC | Age: 74
End: 2024-06-24
Payer: COMMERCIAL

## 2024-06-24 VITALS
BODY MASS INDEX: 35.8 KG/M2 | OXYGEN SATURATION: 99 % | HEART RATE: 72 BPM | RESPIRATION RATE: 16 BRPM | SYSTOLIC BLOOD PRESSURE: 126 MMHG | TEMPERATURE: 97.6 F | HEIGHT: 61 IN | WEIGHT: 189.6 LBS | DIASTOLIC BLOOD PRESSURE: 76 MMHG

## 2024-06-24 DIAGNOSIS — E11.9 TYPE 2 DIABETES MELLITUS WITHOUT COMPLICATION, WITHOUT LONG-TERM CURRENT USE OF INSULIN (H): Primary | ICD-10-CM

## 2024-06-24 DIAGNOSIS — E55.9 VITAMIN D DEFICIENCY: ICD-10-CM

## 2024-06-24 DIAGNOSIS — M19.90 OSTEOARTHRITIS, UNSPECIFIED OSTEOARTHRITIS TYPE, UNSPECIFIED SITE: ICD-10-CM

## 2024-06-24 DIAGNOSIS — E66.01 MORBID OBESITY (H): ICD-10-CM

## 2024-06-24 LAB
ANION GAP SERPL CALCULATED.3IONS-SCNC: 9 MMOL/L (ref 7–15)
BUN SERPL-MCNC: 11.5 MG/DL (ref 8–23)
CALCIUM SERPL-MCNC: 9.6 MG/DL (ref 8.8–10.2)
CHLORIDE SERPL-SCNC: 107 MMOL/L (ref 98–107)
CREAT SERPL-MCNC: 0.65 MG/DL (ref 0.51–0.95)
DEPRECATED HCO3 PLAS-SCNC: 27 MMOL/L (ref 22–29)
EGFRCR SERPLBLD CKD-EPI 2021: >90 ML/MIN/1.73M2
GLUCOSE SERPL-MCNC: 149 MG/DL (ref 70–99)
HBA1C MFR BLD: 6.8 % (ref 0–5.6)
HOLD SPECIMEN: NORMAL
POTASSIUM SERPL-SCNC: 4.1 MMOL/L (ref 3.4–5.3)
SODIUM SERPL-SCNC: 143 MMOL/L (ref 135–145)
VIT D+METAB SERPL-MCNC: 52 NG/ML (ref 20–50)

## 2024-06-24 PROCEDURE — 99214 OFFICE O/P EST MOD 30 MIN: CPT | Performed by: FAMILY MEDICINE

## 2024-06-24 PROCEDURE — 36415 COLL VENOUS BLD VENIPUNCTURE: CPT | Performed by: FAMILY MEDICINE

## 2024-06-24 PROCEDURE — 83036 HEMOGLOBIN GLYCOSYLATED A1C: CPT | Performed by: FAMILY MEDICINE

## 2024-06-24 PROCEDURE — 80048 BASIC METABOLIC PNL TOTAL CA: CPT | Performed by: FAMILY MEDICINE

## 2024-06-24 PROCEDURE — 82306 VITAMIN D 25 HYDROXY: CPT | Performed by: FAMILY MEDICINE

## 2024-06-24 PROCEDURE — G2211 COMPLEX E/M VISIT ADD ON: HCPCS | Performed by: FAMILY MEDICINE

## 2024-06-24 RX ORDER — LANCETS
EACH MISCELLANEOUS
Qty: 100 EACH | Refills: 0 | Status: SHIPPED | OUTPATIENT
Start: 2024-06-24

## 2024-06-24 RX ORDER — CHOLECALCIFEROL (VITAMIN D3) 50 MCG
TABLET ORAL
Qty: 270 TABLET | Refills: 0 | Status: SHIPPED | OUTPATIENT
Start: 2024-06-24

## 2024-06-24 RX ORDER — BLOOD SUGAR DIAGNOSTIC
STRIP MISCELLANEOUS
Qty: 100 STRIP | Refills: 3 | Status: SHIPPED | OUTPATIENT
Start: 2024-06-24

## 2024-06-24 RX ORDER — ACETAMINOPHEN 325 MG/1
650 TABLET ORAL 3 TIMES DAILY PRN
Qty: 100 TABLET | Refills: 12 | Status: SHIPPED | OUTPATIENT
Start: 2024-06-24

## 2024-06-24 ASSESSMENT — PAIN SCALES - GENERAL: PAINLEVEL: NO PAIN (0)

## 2024-06-24 NOTE — PATIENT INSTRUCTIONS
Continue working out/walking    6 months physical appoint make it now, January  Labs today  Dermatology referral for screening skin cancer done  Shots  in the fall , rsv, covid, flu    Sherrill Rubio D.O.

## 2024-06-24 NOTE — PROGRESS NOTES
1. Type 2 diabetes mellitus without complication, without long-term current use of insulin (H)  Doing well, diet controlled, no concerns  - Hemoglobin A1c; Future  - Hemoglobin A1c  - FOOT EXAM  - blood glucose (ACCU-CHEK GUIDE) test strip; Use to test blood sugar  times daily or as directed.  Dispense: 100 strip; Refill: 3  - blood glucose monitoring (SOFTCLIX) lancets; Use to test blood sugar 1 times daily or as directed.  Dispense: 100 each; Refill: 0  - Vitamin D Deficiency; Future  - Basic metabolic panel  (Ca, Cl, CO2, Creat, Gluc, K, Na, BUN); Future  - Vitamin D Deficiency  - Basic metabolic panel  (Ca, Cl, CO2, Creat, Gluc, K, Na, BUN)    2. Morbid obesity (H)  Stable, continue dietary changes    3. Vitamin D deficiency  Last visit was elevated, back off a little if elvated  - FOOT EXAM  - Vitamin D Deficiency; Future  - Basic metabolic panel  (Ca, Cl, CO2, Creat, Gluc, K, Na, BUN); Future  - Vitamin D Deficiency  - Basic metabolic panel  (Ca, Cl, CO2, Creat, Gluc, K, Na, BUN)    4. Osteoarthritis, unspecified osteoarthritis type, unspecified site  Tylneol prn, she does not use much meds  - acetaminophen (TYLENOL) 325 MG tablet; Take 2 tablets (650 mg) by mouth 3 times daily as needed for mild pain  Dispense: 100 tablet; Refill: 12      Follow up in 6-7 months for wellness  Subjective   Ankush is a 74 year old, presenting for the following health issues:  Chronic Disease Management        6/24/2024    10:00 AM   Additional Questions   Roomed by Doreen ADEN   Accompanied by granddaughter         6/24/2024    10:00 AM   Patient Reported Additional Medications   Patient reports taking the following new medications none     HPI       Diabetes Follow-up    How often are you checking your blood sugar? One time daily  What time of day are you checking your blood sugars (select all that apply)?  Before meals  Have you had any blood sugars above 200?  No  Have you had any blood sugars below 70?  Yes   What symptoms  "do you notice when your blood sugar is low?  None  What concerns do you have today about your diabetes? None   Do you have any of these symptoms? (Select all that apply)  No numbness or tingling in feet.  No redness, sores or blisters on feet.  No complaints of excessive thirst.  No reports of blurry vision.  No significant changes to weight.      BP Readings from Last 2 Encounters:   06/24/24 126/76   01/23/24 124/74     Hemoglobin A1C (%)   Date Value   01/23/2024 7.1 (H)   02/28/2023 7.1 (H)   11/04/2020 6.7 (H)   09/04/2019 6.6 (H)     LDL Cholesterol Calculated (mg/dL)   Date Value   01/23/2024 98   02/28/2023 97   09/04/2019 128 (H)   03/24/2017 102 (H)               Review of Systems  Constitutional, HEENT, cardiovascular, pulmonary, GI, , musculoskeletal, neuro, skin, endocrine and psych systems are negative, except as otherwise noted.      Objective    /76 (BP Location: Right arm, Patient Position: Sitting, Cuff Size: Adult Regular)   Pulse 72   Temp 97.6  F (36.4  C) (Oral)   Resp 16   Ht 1.549 m (5' 1\")   Wt 86 kg (189 lb 9.6 oz)   SpO2 99%   BMI 35.82 kg/m    Body mass index is 35.82 kg/m .  Physical Exam   GENERAL: alert and no distress  NECK: no adenopathy, no asymmetry, masses, or scars  RESP: lungs clear to auscultation - no rales, rhonchi or wheezes  CV: regular rate and rhythm, normal S1 S2, no S3 or S4, no murmur, click or rub, no peripheral edema  ABDOMEN: soft, nontender, no hepatosplenomegaly, no masses and bowel sounds normal  MS: no gross musculoskeletal defects noted, no edema    Results for orders placed or performed in visit on 06/24/24   Hemoglobin A1c     Status: Abnormal   Result Value Ref Range    Hemoglobin A1C 6.8 (H) 0.0 - 5.6 %   Extra Tube     Status: None    Narrative    The following orders were created for panel order Extra Tube.  Procedure                               Abnormality         Status                     ---------                               " -----------         ------                     Extra Red Top Tube[[id number]]                               Final result               Extra Green Top (Lithium...[[id number]]                      Final result               Extra Purple Top Tube[[id number]]                            Final result                 Please view results for these tests on the individual orders.   Extra Red Top Tube     Status: None   Result Value Ref Range    Hold Specimen JIC    Extra Green Top (Lithium Heparin) Tube     Status: None   Result Value Ref Range    Hold Specimen JIC    Extra Purple Top Tube     Status: None   Result Value Ref Range    Hold Specimen JIC        Signed Electronically by: Sherrill Rubio DO

## 2024-07-19 ENCOUNTER — PATIENT OUTREACH (OUTPATIENT)
Dept: GERIATRIC MEDICINE | Facility: CLINIC | Age: 74
End: 2024-07-19
Payer: COMMERCIAL

## 2024-07-19 NOTE — PROGRESS NOTES
[facility] Care Coordination Contact      [facility] Six-Month Telephone Assessment    6 month telephone assessment completed on 7/19/24.    ER visits: No  Hospitalizations: No  TCU stays: No  Significant health status changes: None  Falls/Injuries: No  ADL/IADL changes: No  Changes in services: No    Caregiver Assessment follow up:  no. No changes to ADC agency. Spoke to daughter Jass.     Goals: See POC in chart for goal progress documentation.      Will see member in 6 months for an annual health risk assessment.   Encouraged member to call CC with any questions or concerns in the meantime.     Mily Sutton RN  [facility]  [phone]

## 2024-10-17 ENCOUNTER — PATIENT OUTREACH (OUTPATIENT)
Dept: GERIATRIC MEDICINE | Facility: CLINIC | Age: 74
End: 2024-10-17
Payer: COMMERCIAL

## 2024-10-17 NOTE — PROGRESS NOTES
[facility] Care Coordination Contact    Called adult daughter Jass  to schedule annual HRA home visit. HRA has been scheduled for 11/14/24. Reviewed demographics, no changes, obtained dgt's address. No changes in eye clinic and dental clinic. 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   Mily Sutton RN  [facility]  [phone]

## 2024-11-14 NOTE — PROGRESS NOTES
Went to home at scheduled time. No one seemed to be home. Called member number and daughter phone multiple times. Unable to leave message. Text sent to daughter cell phone. Left after 35 minutes.    Daughter called back and apologized. Thought it was next week. Rescheduled for 11/19/24 at 11am.     Mily Sutton RN  [facility]  [phone]

## 2024-11-18 DIAGNOSIS — E55.9 VITAMIN D DEFICIENCY: Primary | ICD-10-CM

## 2024-11-19 ENCOUNTER — PATIENT OUTREACH (OUTPATIENT)
Dept: GERIATRIC MEDICINE | Facility: CLINIC | Age: 74
End: 2024-11-19
Payer: COMMERCIAL

## 2024-11-19 ASSESSMENT — LIFESTYLE VARIABLES
SKIP TO QUESTIONS 9-10: 1
AUDIT-C TOTAL SCORE: 0

## 2024-11-19 NOTE — PROGRESS NOTES
[facility] Care Coordination Contact    [facility] Home Visit Assessment     Home visit for Health Risk Assessment with Ankush Cole completed on November 19, 2024    Type of residence:: Apartment - handicap accessible  Current living arrangement:: I live alone     Assessment completed with:: Patient, Children    Current Care Plan  Member currently receiving the following home care services:   none  Member currently receiving the following community resources: Day Care, Housekeeping/Chore Agency, PCA/CFSS      Medication Review  Medication reconciliation completed in Epic: Yes  Medication set-up & administration: Family/informal caregiver sets up daily.  Self-administers medications.  Medication Risk Assessment Medication (1 or more, place referral to MTM): N/A: No risk factors identified  MTM Referral Placed: No: No risk factors idenified    Mental/Behavioral Health   Depression Screening: PHQ2-0. Member reports that adult day care has helped her stress and she does not get depress anymore since attending adult day care.     Mental health DX:: No        Falls Assessment:   Fallen 2 or more times in the past year?: No   Any fall with injury in the past year?: No    ADL/IADL Dependencies:   Dependent ADLs:: Ambulation-cane, Bathing, Grooming  Dependent IADLs:: Cleaning, Cooking, Laundry, Shopping, Meal Preparation, Medication Management, Money Management, Transportation    Health Plan sponsored benefits: [employer]: Shared information regarding preventative health screening and health plan supplemental benefits/incentives. Reviewed medication disposal form.    PCA Assessment completed at visit: Yes Initial PCA Assessment indicated 1.45 hours per day of PCA.     Elderly Waiver Eligibility: Yes-will continue on EW    Care Plan & Recommendations: member wish to remain living in her apartment 16th floor. Recommend to move to lower floor if possible as member reports has difficulty navigating stairs.  Recommend diabetes education, member refused.   Annual Goal:  Fall, ED, hospitalization prevention  Pain management  Stress management  Diabetes management, A1C level  New Dentures    See [employer] Assessment for detailed assessment information.    Follow-Up Plan: Member informed of future contact, plan to f/u with member with a 6 month telephone assessment.  Contact information shared with member and family, encouraged member to call with any questions or concerns at any time.    [place] care continuum providers: Please see Snapshot and Care Management Flowsheets for Specific details of care plan.    This CC note routed to PCP, Sherrill Rubio.  Mily Sutton RN  [facility]  [phone]

## 2024-11-19 NOTE — Clinical Note
Hi Dr. Rubio,  I completed a home assessment with member. She wish to remain living in the community with elderly waiver services. She declined diabetes education at this time.  Thank you, Mily Sutton RN [facility] [phone]

## 2024-11-20 RX ORDER — CHOLECALCIFEROL (VITAMIN D3) 50 MCG
TABLET ORAL
Qty: 270 TABLET | Refills: 0 | Status: SHIPPED | OUTPATIENT
Start: 2024-11-20

## 2024-12-23 ENCOUNTER — TELEPHONE (OUTPATIENT)
Dept: FAMILY MEDICINE | Facility: CLINIC | Age: 74
End: 2024-12-23

## 2024-12-23 ENCOUNTER — OFFICE VISIT (OUTPATIENT)
Dept: FAMILY MEDICINE | Facility: CLINIC | Age: 74
End: 2024-12-23
Payer: COMMERCIAL

## 2024-12-23 VITALS
OXYGEN SATURATION: 99 % | WEIGHT: 181 LBS | HEART RATE: 74 BPM | HEIGHT: 61 IN | SYSTOLIC BLOOD PRESSURE: 124 MMHG | RESPIRATION RATE: 16 BRPM | TEMPERATURE: 98 F | DIASTOLIC BLOOD PRESSURE: 72 MMHG | BODY MASS INDEX: 34.17 KG/M2

## 2024-12-23 DIAGNOSIS — E66.01 MORBID OBESITY (H): ICD-10-CM

## 2024-12-23 DIAGNOSIS — E11.9 TYPE 2 DIABETES MELLITUS WITHOUT RETINOPATHY (H): Primary | ICD-10-CM

## 2024-12-23 DIAGNOSIS — M19.90 OSTEOARTHRITIS, UNSPECIFIED OSTEOARTHRITIS TYPE, UNSPECIFIED SITE: ICD-10-CM

## 2024-12-23 DIAGNOSIS — E55.9 VITAMIN D DEFICIENCY: ICD-10-CM

## 2024-12-23 DIAGNOSIS — E11.9 TYPE 2 DIABETES MELLITUS WITHOUT COMPLICATION, WITHOUT LONG-TERM CURRENT USE OF INSULIN (H): ICD-10-CM

## 2024-12-23 DIAGNOSIS — M85.80 OSTEOPENIA, UNSPECIFIED LOCATION: ICD-10-CM

## 2024-12-23 DIAGNOSIS — Z13.220 SCREENING FOR LIPOID DISORDERS: ICD-10-CM

## 2024-12-23 LAB
ALBUMIN SERPL BCG-MCNC: 4.2 G/DL (ref 3.5–5.2)
ALP SERPL-CCNC: 73 U/L (ref 40–150)
ALT SERPL W P-5'-P-CCNC: 11 U/L (ref 0–50)
ANION GAP SERPL CALCULATED.3IONS-SCNC: 11 MMOL/L (ref 7–15)
AST SERPL W P-5'-P-CCNC: 22 U/L (ref 0–45)
BILIRUB SERPL-MCNC: 0.4 MG/DL
BUN SERPL-MCNC: 5.5 MG/DL (ref 8–23)
CALCIUM SERPL-MCNC: 9.5 MG/DL (ref 8.8–10.4)
CHLORIDE SERPL-SCNC: 103 MMOL/L (ref 98–107)
CHOLEST SERPL-MCNC: 192 MG/DL
CREAT SERPL-MCNC: 0.68 MG/DL (ref 0.51–0.95)
CREAT UR-MCNC: 103 MG/DL
EGFRCR SERPLBLD CKD-EPI 2021: 90 ML/MIN/1.73M2
EST. AVERAGE GLUCOSE BLD GHB EST-MCNC: 140 MG/DL
GLUCOSE SERPL-MCNC: 118 MG/DL (ref 70–99)
HBA1C MFR BLD: 6.5 % (ref 0–5.6)
HCO3 SERPL-SCNC: 26 MMOL/L (ref 22–29)
HDLC SERPL-MCNC: 81 MG/DL
HOLD SPECIMEN: NORMAL
LDLC SERPL CALC-MCNC: 93 MG/DL
MICROALBUMIN UR-MCNC: <12 MG/L
MICROALBUMIN/CREAT UR: NORMAL MG/G{CREAT}
NONHDLC SERPL-MCNC: 111 MG/DL
POTASSIUM SERPL-SCNC: 4 MMOL/L (ref 3.4–5.3)
PROT SERPL-MCNC: 7.5 G/DL (ref 6.4–8.3)
SODIUM SERPL-SCNC: 140 MMOL/L (ref 135–145)
TRIGL SERPL-MCNC: 88 MG/DL

## 2024-12-23 PROCEDURE — 82043 UR ALBUMIN QUANTITATIVE: CPT | Performed by: FAMILY MEDICINE

## 2024-12-23 PROCEDURE — 90715 TDAP VACCINE 7 YRS/> IM: CPT | Performed by: FAMILY MEDICINE

## 2024-12-23 PROCEDURE — 90471 IMMUNIZATION ADMIN: CPT | Performed by: FAMILY MEDICINE

## 2024-12-23 PROCEDURE — 90662 IIV NO PRSV INCREASED AG IM: CPT | Performed by: FAMILY MEDICINE

## 2024-12-23 PROCEDURE — 80061 LIPID PANEL: CPT | Performed by: FAMILY MEDICINE

## 2024-12-23 PROCEDURE — 36415 COLL VENOUS BLD VENIPUNCTURE: CPT | Performed by: FAMILY MEDICINE

## 2024-12-23 PROCEDURE — 82570 ASSAY OF URINE CREATININE: CPT | Performed by: FAMILY MEDICINE

## 2024-12-23 PROCEDURE — 80053 COMPREHEN METABOLIC PANEL: CPT | Performed by: FAMILY MEDICINE

## 2024-12-23 PROCEDURE — 99214 OFFICE O/P EST MOD 30 MIN: CPT | Mod: 25 | Performed by: FAMILY MEDICINE

## 2024-12-23 PROCEDURE — 90472 IMMUNIZATION ADMIN EACH ADD: CPT | Performed by: FAMILY MEDICINE

## 2024-12-23 PROCEDURE — 83036 HEMOGLOBIN GLYCOSYLATED A1C: CPT | Performed by: FAMILY MEDICINE

## 2024-12-23 RX ORDER — BLOOD-GLUCOSE METER
EACH MISCELLANEOUS
Qty: 1 KIT | Refills: 0 | Status: SHIPPED | OUTPATIENT
Start: 2024-12-23

## 2024-12-23 RX ORDER — LANCETS
EACH MISCELLANEOUS
Qty: 100 EACH | Refills: 0 | Status: SHIPPED | OUTPATIENT
Start: 2024-12-23

## 2024-12-23 RX ORDER — CHOLECALCIFEROL (VITAMIN D3) 50 MCG
TABLET ORAL
Qty: 270 TABLET | Refills: 0 | Status: SHIPPED | OUTPATIENT
Start: 2024-12-23

## 2024-12-23 RX ORDER — ACETAMINOPHEN 325 MG/1
650 TABLET ORAL 3 TIMES DAILY PRN
Qty: 100 TABLET | Refills: 12 | Status: SHIPPED | OUTPATIENT
Start: 2024-12-23

## 2024-12-23 RX ORDER — SENNOSIDES 8.6 MG
650 CAPSULE ORAL EVERY 8 HOURS PRN
Qty: 90 TABLET | Refills: 12 | Status: SHIPPED | OUTPATIENT
Start: 2024-12-23

## 2024-12-23 ASSESSMENT — PAIN SCALES - GENERAL: PAINLEVEL_OUTOF10: NO PAIN (0)

## 2024-12-23 NOTE — TELEPHONE ENCOUNTER
Called patient and left a voicemail message that I was calling to help her schedule a annual wellness at the end of March or beginning of April.    TY Brown  [facility]

## 2024-12-23 NOTE — PATIENT INSTRUCTIONS
Vaccine to be done before leaving  TDAP, flu, RSV, covid  Follow up in end of march , April  For wellness visit  Take care  Sherrill Rubio D.O.        Patient Education

## 2024-12-23 NOTE — PROGRESS NOTES
Assessment & Plan     Type 2 diabetes mellitus without retinopathy (H)  Stable , no meds, diet and exercise  - HEMOGLOBIN A1C  - OPTOMETRY REFERRAL; Future  - Lipid panel reflex to direct LDL Non-fasting; Future  - Albumin Random Urine Quantitative with Creat Ratio  - Comprehensive metabolic panel (BMP + Alb, Alk Phos, ALT, AST, Total. Bili, TP); Future  - CBC with platelets and differential; Future  - Lipid panel reflex to direct LDL Fasting; Future  - Comprehensive metabolic panel (BMP + Alb, Alk Phos, ALT, AST, Total. Bili, TP); Future  - Lipid panel reflex to direct LDL Non-fasting  - Comprehensive metabolic panel (BMP + Alb, Alk Phos, ALT, AST, Total. Bili, TP)    Morbid obesity (H)  Lifestyle changes, diet and exercise    Vitamin D deficiency  Supplement   - Vitamin D3 50 mcg (2000 units) tablet; TAKE ONE TABLET BY MOUTH TWICE A DAY    Osteopenia, unspecified location  Vit D, dexa not due yet    Osteoarthritis of knee, unspecified laterality, unspecified osteoarthritis type  Much better with alovera per patient , takes prn tylenol      Screening for lipoid disorders    - Lipid panel reflex to direct LDL Fasting; Future    Type 2 diabetes mellitus without complication, without long-term current use of insulin (H)    - blood glucose monitoring (ACCU-CHEK LORETTA PLUS) meter device kit; Use to test blood sugar 1 time daily or as directed.  - blood glucose monitoring (SOFTCLIX) lancets; Use to test blood sugar 1 times daily or as directed.  - blood glucose (NO BRAND SPECIFIED) lancets standard; Use to test blood sugar daily and as needed.  - blood glucose (ACCU-CHEK GUIDE) test strip; Use to test blood sugar  times daily or as directed.    Osteoarthritis, unspecified osteoarthritis type, unspecified site    - acetaminophen (TYLENOL) 325 MG tablet; Take 2 tablets (650 mg) by mouth 3 times daily as needed for mild pain.  - acetaminophen (ARTHRITIS PAIN RELIEF) 650 MG CR tablet; Take 1 tablet (650 mg) by mouth every  "8 hours as needed for mild pain or fever.      The longitudinal plan of care for the diagnosis(es)/condition(s) as documented were addressed during this visit. Due to the added complexity in care, I will continue to support Ankush in the subsequent management and with ongoing continuity of care.      BMI  Estimated body mass index is 34.2 kg/m  as calculated from the following:    Height as of this encounter: 1.549 m (5' 1\").    Weight as of this encounter: 82.1 kg (181 lb).   Weight management plan: Discussed healthy diet and exercise guidelines      See Patient Instructions    Subjective   Ankush is a 74 year old, presenting for the following health issues:  Chronic Disease Management        12/23/2024    10:15 AM   Additional Questions   Roomed by Gretta           Review of Systems  Constitutional, HEENT, cardiovascular, pulmonary, GI, , musculoskeletal, neuro, skin, endocrine and psych systems are negative, except as otherwise noted.      Objective    /72   Pulse 74   Temp 98  F (36.7  C) (Oral)   Resp 16   Ht 1.549 m (5' 1\")   Wt 82.1 kg (181 lb)   SpO2 99%   BMI 34.20 kg/m    Body mass index is 34.2 kg/m .  Physical Exam   GENERAL: alert and no distress  NECK: no adenopathy, no asymmetry, masses, or scars  RESP: lungs clear to auscultation - no rales, rhonchi or wheezes  CV: regular rate and rhythm, normal S1 S2, no S3 or S4, no murmur, click or rub, no peripheral edema  ABDOMEN: soft, nontender, no hepatosplenomegaly, no masses and bowel sounds normal  MS: no gross musculoskeletal defects noted, no edema            Signed Electronically by: Sherrill Rubio DO    Answers submitted by the patient for this visit:  Diabetes Visit (Submitted on 12/23/2024)  Chief Complaint: Chronic problems general questions HPI Form  Frequency of checking blood sugars:: one time daily  What time of day are you checking your blood sugars : before meals  Have you had any blood sugars above 200?: No  Have " you had any blood sugars below 70?: No  Hypoglycemia symptoms:: none  Diabetic concerns:: none  Paraesthesia present:: none of these symptoms  Have you had a diabetic eye exam within the last year?: No  General Questionnaire (Submitted on 12/23/2024)  Chief Complaint: Chronic problems general questions HPI Form  Questionnaire about: Chronic problems general questions HPI Form (Submitted on 12/23/2024)  Chief Complaint: Chronic problems general questions HPI Form

## 2024-12-24 NOTE — TELEPHONE ENCOUNTER
Called patient and left a voicemail message that I was calling to help her schedule a annual wellness at the end of March or beginning of April.    TY Brown  [facility]

## 2024-12-31 NOTE — TELEPHONE ENCOUNTER
3rd attempt.    Called patient and left a voicemail message that I was calling to help her schedule a annual wellness at the end of March or beginning of April.    TY Brown  [facility]

## 2025-01-15 ENCOUNTER — PATIENT OUTREACH (OUTPATIENT)
Dept: GERIATRIC MEDICINE | Facility: CLINIC | Age: 75
End: 2025-01-15
Payer: COMMERCIAL

## 2025-01-15 NOTE — PROGRESS NOTES
[facility] Care Coordination Contact    Received after visit chart from care coordinator.  Completed following tasks: Mailed copy of support plan to member, Mailed [place] Choices signature sheet pages 3-4, Mailed Safe Medication Disposal , Mailed Transition of Care Member Handout, Submitted referrals/auths for ADC, Homemaking, Transportation, Uploaded consent to communicate form(s) to Epic, and Updated services in Database.  , Provider Signature - No Support Plan Shared:  Member indicates that they do not want their support plan shared with any EW providers.    [facility]:  Emailed required CFSS documents to [facility].  Sent consultation service auth to [facility]. Mailed member supplemental summary chart and assessment summary letter.     EOV task received from  on 01/15/2025.    Matilde Agee    Care Management Specialist   [facility]  [phone]

## 2025-01-15 NOTE — LETTER
January 15, 2025       ANKUSH PRIETO  [address]  [place] [address]      Dear Ankush,    At [facility], we re dedicated to improving your health and wellness. Enclosed is the Support Plan developed with you on 11/19/2024. Please review the Support Plan carefully.    As a reminder, during your visit we talked about:   Ways to manage your physical and mental health   Using health care to maintain and improve your health    Your preventive care needs      Remember to contact your care coordinator if you:   Are hospitalized or plan to be hospitalized    Have a fall     Have a change in your physical or mental health   Need help finding support or services    If you have questions or don t agree with your Support Plan, call me at [phone]. You can also call me if your needs change. TTY users call the [place] Relay at 128 or [phone] (pxkmoo-kg-ebvhhr relay service).    Sincerely,       Mily Sutton RN, BSN, PHN  [phone]  [email or web address]                [id number] accepted     (06/2024)                [address], [place] [address]  [phone]  fax [phone]  [facility]

## 2025-02-06 ENCOUNTER — PATIENT OUTREACH (OUTPATIENT)
Dept: GERIATRIC MEDICINE | Facility: CLINIC | Age: 75
End: 2025-02-06
Payer: COMMERCIAL

## 2025-02-06 NOTE — PROGRESS NOTES
[facility] Care Coordination Contact  Received called form daughter Jass. Reports [place] consultation services is needing SA. Also homemaking and adult day care has not received SA.     Called to [facility] and they confirmed, received auth for homemaking and adult day care.     Called to [facility] to confirmed.  took note and will pass on to Abdullahi Whittaker to contact care coordinator.     Mily Sutton RN  [facility]  [phone]

## 2025-02-10 NOTE — PATIENT INSTRUCTIONS
Patient educated on importance of good blood sugar control.  Letter sent to primary care provider with diabetic eye exam report.     You have an epiretinal membrane.  As we grow older, the thick vitreous gel in the middle of our eyes begins to shrink and pull away from the macula. As the vitreous pulls away, scar tissue may develop on the macula. Sometimes the scar tissue can warp and contract, causing the retina to wrinkle or become swollen or distorted.    You have the start of mild cataracts.  You may notice some blurred vision or glare with night driving.  It is important that you wear good sunglasses to protect your eyes from the ultraviolet light from the sun.     Reading glasses prescription provided today. Optional to fill, minimal change.    Return in 1 year for a comprehensive eye exam, or sooner if needed.      The effects of the dilating drops last for 4- 6 hours.  You will be more sensitive to light and vision will be blurry up close.  Mydriatic sunglasses were given if needed.     Jakob Pretty, KELSIE  [facility]  [address] [place] [address]. MAYELIN Jama  [phone]    pt reporting to the ED for papular rash to bilateral hand, sole of feet and judy area X 2 weeks. Denies dysuria, hematuria, abdominal pain, , Fever or chills. pt reporting to the ED for pruritic papular rash to bilateral hand, sole of feet and judy area X 2 weeks. Denies dysuria, hematuria, abdominal pain, , Fever or chills.

## 2025-03-18 ENCOUNTER — TELEPHONE (OUTPATIENT)
Dept: FAMILY MEDICINE | Facility: CLINIC | Age: 75
End: 2025-03-18
Payer: COMMERCIAL

## 2025-03-18 NOTE — TELEPHONE ENCOUNTER
Forms received from [facility]/ Initial/Annual Physician's Letter (date: 3/17/25) for Dr. Rubio.  Forms placed in provider 'sign me' folder.  Please fax forms to [phone] after completion.    Jakob SUE (R) (ARRT)  Radiology Dept

## 2025-04-03 NOTE — TELEPHONE ENCOUNTER
Received form again because we could not locate the first form.   Placed in Dr Rubio's sign me folder.    TY Brown  [facility]

## 2025-04-17 ENCOUNTER — TELEPHONE (OUTPATIENT)
Dept: FAMILY MEDICINE | Facility: CLINIC | Age: 75
End: 2025-04-17
Payer: COMMERCIAL

## 2025-04-17 NOTE — LETTER
April 17, 2025      Ankush Cole  [address]  [place] [address]            Dear Ankush Cole,    Your clinic record indicates that you are due for an Annual Medicare Wellness exam. Please call the  at [phone] to schedule an appointment.       If you have questions about this letter please contact your provider.    Sincerely,    Your [facility] Team

## 2025-04-17 NOTE — TELEPHONE ENCOUNTER
Patient Quality Outreach    Patient is due for the following:   Physical Annual Wellness Visit    Action(s) Taken:   Schedule a Annual Wellness Visit    Type of outreach:    Sent letter.    Questions for provider review:    None         Gretta Gregg CMA  Chart routed to None.

## 2025-05-13 ENCOUNTER — PATIENT OUTREACH (OUTPATIENT)
Dept: GERIATRIC MEDICINE | Facility: CLINIC | Age: 75
End: 2025-05-13
Payer: COMMERCIAL

## 2025-05-13 NOTE — PROGRESS NOTES
Received email and call from Delonte from [facility] consultation service that she is unable to get ahold of the contact.   Called to daughter Jass. Reports she spoke to them a few weeks ago and sign some papers. Explained that they are trying to reach for the deliver plan. Daughter verb understanding.   Emailed Delonte back to call the cell phone number as care coordinator just spoke with her.   Mily Sutton RN  [facility]  [phone]

## 2025-05-29 ENCOUNTER — PATIENT OUTREACH (OUTPATIENT)
Dept: GERIATRIC MEDICINE | Facility: CLINIC | Age: 75
End: 2025-05-29
Payer: COMMERCIAL

## 2025-05-29 NOTE — PROGRESS NOTES
[facility] Care Coordination Contact    Daughter Jass left a voice mail. Delonte did not call her at appointment time. She called [employer], and was informed that Delonte left the agency.   Care coordinator called [employer]. Spoke to . Obtained new for new : Jesica Cochran [phone].  Called daughter and provided new  number to call him. Instructed to inform Jesica that assessment was back in November 2024 and member does not have PCA. Daughter verb understanding.   Mily Sutton RN  [facility]  [phone]

## 2025-06-19 ENCOUNTER — PATIENT OUTREACH (OUTPATIENT)
Dept: GERIATRIC MEDICINE | Facility: CLINIC | Age: 75
End: 2025-06-19
Payer: COMMERCIAL

## 2025-06-19 NOTE — PROGRESS NOTES
[facility] Care Coordination Contact      [facility] Six-Month Telephone Assessment    6 month telephone assessment completed on 6/19/2025.    ER visits: No  Hospitalizations: No  TCU stays: No  Significant health status changes: none. Per daughter, pain is the same to hands and knees but member has been walking more and increased strength in legs. No report of stress/depression. Daughter Jass reports, member has been enjoying adult day care and also comes to her house. She takes member to shopping malls and stores. Last A1C is 6.5 in 12/2024. Per Jass, member has been eating diabetic diet.   Falls/Injuries: No  ADL/IADL changes: No  Changes in services: No    Caregiver Assessment follow up:  called and informed daughter received [facility] and Supports SDP and will be sent auth to Health plan tomorrow. Jass verb understanding. Reviewed 6 months with daughter.     Called and informed [facility], 1.45 hours per day (7units) from 6/19/25 to 11/30/2025. Staff verb senait.     Goals: See Support Plan for goal progress documentation.    Continue Goal:  Fall, ED, hospitalization prevention  Pain management  Stress management  Diabetes management, A1C level    Goal Met  New Dentures    Will see member in 6 months for an annual health risk assessment.   Encouraged member to call CC with any questions or concerns in the meantime.     Mily Sutton RN  [facility]  [phone]

## 2025-07-28 ENCOUNTER — OFFICE VISIT (OUTPATIENT)
Dept: FAMILY MEDICINE | Facility: CLINIC | Age: 75
End: 2025-07-28
Payer: COMMERCIAL

## 2025-07-28 VITALS
BODY MASS INDEX: 35.27 KG/M2 | RESPIRATION RATE: 18 BRPM | SYSTOLIC BLOOD PRESSURE: 141 MMHG | OXYGEN SATURATION: 100 % | WEIGHT: 186.8 LBS | DIASTOLIC BLOOD PRESSURE: 77 MMHG | HEIGHT: 61 IN | TEMPERATURE: 97.9 F | HEART RATE: 66 BPM

## 2025-07-28 DIAGNOSIS — M19.90 OSTEOARTHRITIS, UNSPECIFIED OSTEOARTHRITIS TYPE, UNSPECIFIED SITE: ICD-10-CM

## 2025-07-28 DIAGNOSIS — Z13.220 LIPID SCREENING: ICD-10-CM

## 2025-07-28 DIAGNOSIS — Z12.31 ENCOUNTER FOR SCREENING MAMMOGRAM FOR BREAST CANCER: ICD-10-CM

## 2025-07-28 DIAGNOSIS — M17.9 OSTEOARTHRITIS OF KNEE, UNSPECIFIED LATERALITY, UNSPECIFIED OSTEOARTHRITIS TYPE: ICD-10-CM

## 2025-07-28 DIAGNOSIS — E66.01 MORBID OBESITY (H): ICD-10-CM

## 2025-07-28 DIAGNOSIS — R03.0 ELEVATED BP WITHOUT DIAGNOSIS OF HYPERTENSION: ICD-10-CM

## 2025-07-28 DIAGNOSIS — Z00.00 ROUTINE GENERAL MEDICAL EXAMINATION AT A HEALTH CARE FACILITY: Primary | ICD-10-CM

## 2025-07-28 DIAGNOSIS — E11.9 TYPE 2 DIABETES MELLITUS WITHOUT RETINOPATHY (H): ICD-10-CM

## 2025-07-28 LAB
ALBUMIN SERPL BCG-MCNC: 4.2 G/DL (ref 3.5–5.2)
ALP SERPL-CCNC: 53 U/L (ref 40–150)
ALT SERPL W P-5'-P-CCNC: 15 U/L (ref 0–50)
ANION GAP SERPL CALCULATED.3IONS-SCNC: 10 MMOL/L (ref 7–15)
AST SERPL W P-5'-P-CCNC: 23 U/L (ref 0–45)
BASOPHILS # BLD AUTO: 0 10E3/UL (ref 0–0.2)
BASOPHILS NFR BLD AUTO: 1 %
BILIRUB SERPL-MCNC: 0.4 MG/DL
BUN SERPL-MCNC: 12.7 MG/DL (ref 8–23)
CALCIUM SERPL-MCNC: 9.6 MG/DL (ref 8.8–10.4)
CHLORIDE SERPL-SCNC: 105 MMOL/L (ref 98–107)
CHOLEST SERPL-MCNC: 212 MG/DL
CREAT SERPL-MCNC: 0.64 MG/DL (ref 0.51–0.95)
CREAT UR-MCNC: 63.8 MG/DL
EGFRCR SERPLBLD CKD-EPI 2021: >90 ML/MIN/1.73M2
EOSINOPHIL # BLD AUTO: 0.1 10E3/UL (ref 0–0.7)
EOSINOPHIL NFR BLD AUTO: 2 %
ERYTHROCYTE [DISTWIDTH] IN BLOOD BY AUTOMATED COUNT: 13.7 % (ref 10–15)
EST. AVERAGE GLUCOSE BLD GHB EST-MCNC: 140 MG/DL
FASTING STATUS PATIENT QL REPORTED: YES
FASTING STATUS PATIENT QL REPORTED: YES
GLUCOSE SERPL-MCNC: 138 MG/DL (ref 70–99)
HBA1C MFR BLD: 6.5 % (ref 0–5.6)
HCO3 SERPL-SCNC: 26 MMOL/L (ref 22–29)
HCT VFR BLD AUTO: 39.6 % (ref 35–47)
HDLC SERPL-MCNC: 93 MG/DL
HGB BLD-MCNC: 12.8 G/DL (ref 11.7–15.7)
IMM GRANULOCYTES # BLD: 0 10E3/UL
IMM GRANULOCYTES NFR BLD: 1 %
LDLC SERPL CALC-MCNC: 107 MG/DL
LYMPHOCYTES # BLD AUTO: 1.9 10E3/UL (ref 0.8–5.3)
LYMPHOCYTES NFR BLD AUTO: 33 %
MCH RBC QN AUTO: 29.4 PG (ref 26.5–33)
MCHC RBC AUTO-ENTMCNC: 32.3 G/DL (ref 31.5–36.5)
MCV RBC AUTO: 91 FL (ref 78–100)
MICROALBUMIN UR-MCNC: <12 MG/L
MICROALBUMIN/CREAT UR: NORMAL MG/G{CREAT}
MONOCYTES # BLD AUTO: 0.6 10E3/UL (ref 0–1.3)
MONOCYTES NFR BLD AUTO: 10 %
NEUTROPHILS # BLD AUTO: 3.1 10E3/UL (ref 1.6–8.3)
NEUTROPHILS NFR BLD AUTO: 55 %
NONHDLC SERPL-MCNC: 119 MG/DL
PLATELET # BLD AUTO: 208 10E3/UL (ref 150–450)
POTASSIUM SERPL-SCNC: 4.3 MMOL/L (ref 3.4–5.3)
PROT SERPL-MCNC: 7.3 G/DL (ref 6.4–8.3)
RBC # BLD AUTO: 4.36 10E6/UL (ref 3.8–5.2)
SODIUM SERPL-SCNC: 141 MMOL/L (ref 135–145)
TRIGL SERPL-MCNC: 60 MG/DL
WBC # BLD AUTO: 5.7 10E3/UL (ref 4–11)

## 2025-07-28 PROCEDURE — 1126F AMNT PAIN NOTED NONE PRSNT: CPT | Performed by: FAMILY MEDICINE

## 2025-07-28 PROCEDURE — 83036 HEMOGLOBIN GLYCOSYLATED A1C: CPT | Performed by: FAMILY MEDICINE

## 2025-07-28 PROCEDURE — G2211 COMPLEX E/M VISIT ADD ON: HCPCS | Performed by: FAMILY MEDICINE

## 2025-07-28 PROCEDURE — 85025 COMPLETE CBC W/AUTO DIFF WBC: CPT | Performed by: FAMILY MEDICINE

## 2025-07-28 PROCEDURE — 3078F DIAST BP <80 MM HG: CPT | Performed by: FAMILY MEDICINE

## 2025-07-28 PROCEDURE — 3044F HG A1C LEVEL LT 7.0%: CPT | Performed by: FAMILY MEDICINE

## 2025-07-28 PROCEDURE — 36415 COLL VENOUS BLD VENIPUNCTURE: CPT | Performed by: FAMILY MEDICINE

## 2025-07-28 PROCEDURE — 82043 UR ALBUMIN QUANTITATIVE: CPT | Performed by: FAMILY MEDICINE

## 2025-07-28 PROCEDURE — 3077F SYST BP >= 140 MM HG: CPT | Performed by: FAMILY MEDICINE

## 2025-07-28 PROCEDURE — 80061 LIPID PANEL: CPT | Performed by: FAMILY MEDICINE

## 2025-07-28 PROCEDURE — 99397 PER PM REEVAL EST PAT 65+ YR: CPT | Performed by: FAMILY MEDICINE

## 2025-07-28 PROCEDURE — 82570 ASSAY OF URINE CREATININE: CPT | Performed by: FAMILY MEDICINE

## 2025-07-28 PROCEDURE — 99214 OFFICE O/P EST MOD 30 MIN: CPT | Mod: 25 | Performed by: FAMILY MEDICINE

## 2025-07-28 PROCEDURE — 80053 COMPREHEN METABOLIC PANEL: CPT | Performed by: FAMILY MEDICINE

## 2025-07-28 RX ORDER — LOSARTAN POTASSIUM 25 MG/1
25 TABLET ORAL DAILY
Qty: 90 TABLET | Refills: 0 | Status: SHIPPED | OUTPATIENT
Start: 2025-07-28

## 2025-07-28 RX ORDER — DICLOFENAC SODIUM 75 MG/1
TABLET, DELAYED RELEASE ORAL
Qty: 60 TABLET | Refills: 1 | Status: SHIPPED | OUTPATIENT
Start: 2025-07-28

## 2025-07-28 SDOH — HEALTH STABILITY: PHYSICAL HEALTH: ON AVERAGE, HOW MANY MINUTES DO YOU ENGAGE IN EXERCISE AT THIS LEVEL?: 30 MIN

## 2025-07-28 SDOH — HEALTH STABILITY: PHYSICAL HEALTH: ON AVERAGE, HOW MANY DAYS PER WEEK DO YOU ENGAGE IN MODERATE TO STRENUOUS EXERCISE (LIKE A BRISK WALK)?: 3 DAYS

## 2025-07-28 ASSESSMENT — SOCIAL DETERMINANTS OF HEALTH (SDOH): HOW OFTEN DO YOU GET TOGETHER WITH FRIENDS OR RELATIVES?: MORE THAN THREE TIMES A WEEK

## 2025-07-28 ASSESSMENT — PAIN SCALES - GENERAL: PAINLEVEL_OUTOF10: NO PAIN (0)

## 2025-07-28 NOTE — PATIENT INSTRUCTIONS
Check bp's at home, if not normal ,start low dose med  Follow up with me in 3-4 weeks, make appoint today please  Eye exam  Mammo  Your sugar is the same as last check  Covid and rsv vaccines advised  Take care  Sherrill Rubio D.O.    Patient Education   Preventive Care Advice   This is general advice given by our system to help you stay healthy. However, your care team may have specific advice just for you. Please talk to your care team about your preventive care needs.  Nutrition  Eat 5 or more servings of fruits and vegetables each day.  Try wheat bread, brown rice and whole grain pasta (instead of white bread, rice, and pasta).  Get enough calcium and vitamin D. Check the label on foods and aim for 100% of the RDA (recommended daily allowance).  Lifestyle  Exercise at least 150 minutes each week  (30 minutes a day, 5 days a week).  Do muscle strengthening activities 2 days a week. These help control your weight and prevent disease.  No smoking.  Wear sunscreen to prevent skin cancer.  Have a dental exam and cleaning every 6 months.  Yearly exams  See your health care team every year to talk about:  Any changes in your health.  Any medicines your care team has prescribed.  Preventive care, family planning, and ways to prevent chronic diseases.  Shots (vaccines)   HPV shots (up to age 26), if you've never had them before.  Hepatitis B shots (up to age 59), if you've never had them before.  COVID-19 shot: Get this shot when it's due.  Flu shot: Get a flu shot every year.  Tetanus shot: Get a tetanus shot every 10 years.  Pneumococcal, hepatitis A, and RSV shots: Ask your care team if you need these based on your risk.  Shingles shot (for age 50 and up)  General health tests  Diabetes screening:  Starting at age 35, Get screened for diabetes at least every 3 years.  If you are younger than age 35, ask your care team if you should be screened for diabetes.  Cholesterol test: At age 39, start having a cholesterol  test every 5 years, or more often if advised.  Bone density scan (DEXA): At age 50, ask your care team if you should have this scan for osteoporosis (brittle bones).  Hepatitis C: Get tested at least once in your life.  STIs (sexually transmitted infections)  Before age 24: Ask your care team if you should be screened for STIs.  After age 24: Get screened for STIs if you're at risk. You are at risk for STIs (including HIV) if:  You are sexually active with more than one person.  You don't use condoms every time.  You or a partner was diagnosed with a sexually transmitted infection.  If you are at risk for HIV, ask about PrEP medicine to prevent HIV.  Get tested for HIV at least once in your life, whether you are at risk for HIV or not.  Cancer screening tests  Cervical cancer screening: If you have a cervix, begin getting regular cervical cancer screening tests starting at age 21.  Breast cancer scan (mammogram): If you've ever had breasts, begin having regular mammograms starting at age 40. This is a scan to check for breast cancer.  Colon cancer screening: It is important to start screening for colon cancer at age 45.  Have a colonoscopy test every 10 years (or more often if you're at risk) Or, ask your provider about stool tests like a FIT test every year or Cologuard test every 3 years.  To learn more about your testing options, visit:   .  For help making a decision, visit:   https://bit.ly/re20696.  Prostate cancer screening test: If you have a prostate, ask your care team if a prostate cancer screening test (PSA) at age 55 is right for you.  Lung cancer screening: If you are a current or former smoker ages 50 to 80, ask your care team if ongoing lung cancer screenings are right for you.  For informational purposes only. Not to replace the advice of your health care provider. Copyright   2023 [employer]. All rights reserved. Clinically reviewed by the [facility].  [employer] 421602 - REV 01/24.  Learning About Activities of Daily Living  What are activities of daily living?     Activities of daily living (ADLs) are the basic self-care tasks you do every day. These include eating, bathing, dressing, and moving around.  As you age, and if you have health problems, you may find that it's harder to do some of these tasks. If so, your doctor can suggest ideas that may help.  To measure what kind of help you may need, your doctor will ask how well you are able to do ADLs. Let your doctor know if there are any tasks that you are having trouble doing. This is an important first step to getting help. And when you have the help you need, you can stay as independent as possible.  How will a doctor assess your ADLs?  Asking about ADLs is part of a routine health checkup your doctor will likely do as you age. Your health check might be done in a doctor's office, in your home, or at a hospital. The goal is to find out if you are having any problems that could make it hard to care for yourself or that make it unsafe for you to be on your own.  To measure your ADLs, your doctor will ask how hard it is for you to do routine tasks. Your doctor may also want to know if you have changed the way you do a task because of a health problem. Your doctor may watch how you:  Walk back and forth.  Keep your balance while you stand or walk.  Move from sitting to standing or from a bed to a chair.  Button or unbutton a shirt or sweater.  Remove and put on your shoes.  It's common to feel a little worried or anxious if you find you can't do all the things you used to be able to do. Talking with your doctor about ADLs is a way to make sure you're as safe as possible and able to care for yourself as well as you can. You may want to bring a caregiver, friend, or family member to your checkup. They can help you talk to your doctor.  Follow-up care is a key part of your treatment and safety. Be sure to make and  go to all appointments, and call your doctor if you are having problems. It's also a good idea to know your test results and keep a list of the medicines you take.  Current as of: October 24, 2024  Content Version: 14.5    5996-8927 [employer].   Care instructions adapted under license by your healthcare professional. If you have questions about a medical condition or this instruction, always ask your healthcare professional. [employer] disclaims any warranty or liability for your use of this information.    Learning About Sleeping Well  What does sleeping well mean?     Sleeping well means getting enough sleep to feel good and stay healthy. How much sleep is enough varies among people.  The number of hours you sleep and how you feel when you wake up are both important. If you do not feel refreshed, you probably need more sleep. Another sign of not getting enough sleep is feeling tired during the day.  Experts recommend that adults get at least 7 or more hours of sleep per day. Children and older adults need more sleep.  Why is getting enough sleep important?  Getting enough quality sleep is a basic part of good health. When your sleep suffers, your physical health, mood, and your thoughts can suffer too. You may find yourself feeling more grumpy or stressed. Not getting enough sleep also can lead to serious problems, including injury, accidents, anxiety, and depression.  What might cause poor sleeping?  Many things can cause sleep problems, including:  Changes to your sleep schedule.  Stress. Stress can be caused by fear about a single event, such as giving a speech. Or you may have ongoing stress, such as worry about work or school.  Depression, anxiety, and other mental or emotional conditions.  Changes in your sleep habits or surroundings. This includes changes that happen where you sleep, such as noise, light, or sleeping in a different bed. It also includes changes in your sleep  "pattern, such as having jet lag or working a late shift.  Health problems, such as pain, breathing problems, and restless legs syndrome.  Lack of regular exercise.  Using alcohol, nicotine, or caffeine before bed.  How can you help yourself?  Here are some tips that may help you sleep more soundly and wake up feeling more refreshed.  Your sleeping area   Use your bedroom only for sleeping and sex. A bit of light reading may help you fall asleep. But if it doesn't, do your reading elsewhere in the house. Try not to use your TV, computer, smartphone, or tablet while you are in bed.  Be sure your bed is big enough to stretch out comfortably, especially if you have a sleep partner.  Keep your bedroom quiet, dark, and cool. Use curtains, blinds, or a sleep mask to block out light. To block out noise, use earplugs, soothing music, or a \"white noise\" machine.  Your evening and bedtime routine   Create a relaxing bedtime routine. You might want to take a warm shower or bath, or listen to soothing music.  Go to bed at the same time every night. And get up at the same time every morning, even if you feel tired.  What to avoid   Limit caffeine (coffee, tea, caffeinated sodas) during the day, and don't have any for at least 6 hours before bedtime.  Avoid drinking alcohol before bedtime. Alcohol can cause you to wake up more often during the night.  Try not to smoke or use tobacco, especially in the evening. Nicotine can keep you awake.  Limit naps during the day, especially close to bedtime.  Avoid lying in bed awake for too long. If you can't fall asleep or if you wake up in the middle of the night and can't get back to sleep within about 20 minutes, get out of bed and go to another room until you feel sleepy.  Avoid taking medicine right before bed that may keep you awake or make you feel hyper or energized. Your doctor can tell you if your medicine may do this and if you can take it earlier in the day.  If you can't sleep " "  Imagine yourself in a peaceful, pleasant scene. Focus on the details and feelings of being in a place that is relaxing.  Get up and do a quiet or boring activity until you feel sleepy.  Avoid drinking any liquids before going to bed to help prevent waking up often to use the bathroom.  Where can you learn more?  Go to https://www.[employer].net/patiented  Enter J942 in the search box to learn more about \"Learning About Sleeping Well.\"  Current as of: July 31, 2024  Content Version: 14.5    7564-7107 [employer].   Care instructions adapted under license by your healthcare professional. If you have questions about a medical condition or this instruction, always ask your healthcare professional. [employer] disclaims any warranty or liability for your use of this information.       "

## 2025-07-28 NOTE — PROGRESS NOTES
"Preventive Care Visit  [facility]  Sherrill Rodriguez RamiroDO, Family Medicine  Jul 28, 2025      Assessment & Plan     Routine general medical examination at a health care facility  Doing well, declined vaccines  - CBC with platelets and differential; Future  - Comprehensive metabolic panel (BMP + Alb, Alk Phos, ALT, AST, Total. Bili, TP); Future  - CBC with platelets and differential  - Comprehensive metabolic panel (BMP + Alb, Alk Phos, ALT, AST, Total. Bili, TP)    Type 2 diabetes mellitus without retinopathy (H)  No meds, diet controlled, stable ,   - HEMOGLOBIN A1C; Future  - FOOT EXAM  - Albumin Random Urine Quantitative with Creat Ratio; Future  - Adult Eye  Referral; Future  - HEMOGLOBIN A1C  - Albumin Random Urine Quantitative with Creat Ratio  - losartan (COZAAR) 25 MG tablet; Take 1 tablet (25 mg) by mouth daily.    Osteoarthritis, unspecified osteoarthritis type, unspecified site  Stable on PRN med, she is walking and doing well   - diclofenac (VOLTAREN) 75 MG EC tablet; TAKE ONE TABLET BY MOUTH TWICE A DAY AS NEEDED FOR MODERATE PAIN    Elevated BP without diagnosis of hypertension  Check at home, start low dose and follow up in 4 weeks  - losartan (COZAAR) 25 MG tablet; Take 1 tablet (25 mg) by mouth daily.    Morbid obesity (H)  Dietary and lifestyle changes    Osteoarthritis of knee, unspecified laterality, unspecified osteoarthritis type  stable    Lipid screening    - Lipid panel reflex to direct LDL Fasting; Future  - Lipid panel reflex to direct LDL Fasting    Encounter for screening mammogram for breast cancer    - MA Screen Bilateral w/William; Future  Patient has been advised of split billing requirements and indicates understanding: Yes    BMI  Estimated body mass index is 35.41 kg/m  as calculated from the following:    Height as of this encounter: 1.547 m (5' 0.9\").    Weight as of this encounter: 84.7 kg (186 lb 12.8 oz).   Weight management plan: " Discussed healthy diet and exercise guidelines    Counseling  Appropriate preventive services were addressed with this patient via screening, questionnaire, or discussion as appropriate for fall prevention, nutrition, physical activity, Tobacco-use cessation, social engagement, weight loss and cognition.  Checklist reviewing preventive services available has been given to the patient.  Reviewed patient's diet, addressing concerns and/or questions.   She is at risk for lack of exercise and has been provided with information to increase physical activity for the benefit of her well-being.   Discussed possible causes of fatigue. Updated plan of care.  Patient reported difficulty with activities of daily living were addressed today.Patient reported safety concerns were addressed today.Reviewed preventive health counseling, as reflected in patient instructions       Regular exercise       Healthy diet/nutrition       Vision screening       Hearing screening    Follow-up    Follow-up Visit   Expected date:  Jul 28, 2026 (Approximate)      Follow Up Appointment Details:     Follow-up with whom?: PCP    Follow-Up for what?: Adult Preventive    How?: In Person                 Lashae Lechuga is a 75 year old, presenting for the following:  Medicare Visit        7/28/2025     8:43 AM   Additional Questions   Roomed by Jemma   Accompanied by Daughter Jass         7/28/2025     8:43 AM   Patient Reported Additional Medications   Patient reports taking the following new medications none          HPI  [id number] diabetes check  fasting   Colonoscopy-2027  Diabetes Follow-up    How often are you checking your blood sugar? A few times a week  What time of day are you checking your blood sugars (select all that apply)?  Before meals  Have you had any blood sugars above 200?  No  Have you had any blood sugars below 70?  No  What symptoms do you notice when your blood sugar is low?  None  What concerns do you have today about your  diabetes? None   Do you have any of these symptoms? (Select all that apply)  No numbness or tingling in feet.  No redness, sores or blisters on feet.  No complaints of excessive thirst.  No reports of blurry vision.  No significant changes to weight.  Have you had a diabetic eye exam in the last 12 months? No        BP Readings from Last 2 Encounters:   07/28/25 (!) 141/77   12/23/24 124/72     Hemoglobin A1C (%)   Date Value   12/23/2024 6.5 (H)   06/24/2024 6.8 (H)   11/04/2020 6.7 (H)   09/04/2019 6.6 (H)     LDL Cholesterol Calculated (mg/dL)   Date Value   12/23/2024 93   01/23/2024 98   09/04/2019 128 (H)   03/24/2017 102 (H)             Advance Care Planning    Discussed advance care planning with patient; informed AVS has link to [employer].        7/28/2025   General Health   How would you rate your overall physical health? (!) FAIR   Feel stress (tense, anxious, or unable to sleep) Not at all         7/28/2025   Nutrition   Diet: Regular (no restrictions)         7/28/2025   Exercise   Days per week of moderate/strenous exercise 3 days   Average minutes spent exercising at this level 30 min         7/28/2025   Social Factors   Frequency of gathering with friends or relatives More than three times a week   Worry food won't last until get money to buy more No   Food not last or not have enough money for food? No   Do you have housing? (Housing is defined as stable permanent housing and does not include staying outside in a car, in a tent, in an abandoned building, in an overnight shelter, or couch-surfing.) No   Are you worried about losing your housing? No   Lack of transportation? No   Unable to get utilities (heat,electricity)? No   Want help with housing or utility concern? No   (!) HOUSING CONCERN PRESENT      7/28/2025   Fall Risk   Fallen 2 or more times in the past year? No   Trouble with walking or balance? No          7/28/2025   Activities of Daily Living- Home Safety   Needs help with the  following daily activites Telephone use    Transportation    Shopping    Preparing meals    Housework    Bathing    Laundry    Medication administration    Money management    Dressing   Do you have the help that you need? Yes for Some   Safety concerns in the home No grab bars in the bathroom    No handrails on the stairs       Multiple values from one day are sorted in reverse-chronological order         7/28/2025   Dental   Dentist two times every year? Yes         7/28/2025   Hearing Screening   Hearing concerns? None of the above         7/28/2025   Driving Risk Screening   Patient/family members have concerns about driving No         7/28/2025   General Alertness/Fatigue Screening   Have you been more tired than usual lately? (!) YES         7/28/2025   Urinary Incontinence Screening   Bothered by leaking urine in past 6 months No         Today's PHQ-2 Score:       7/28/2025     8:47 AM   PHQ-2 ( 1999 Pfizer)   Q1: Little interest or pleasure in doing things 0   Q2: Feeling down, depressed or hopeless 0   PHQ-2 Score 0    Q1: Little interest or pleasure in doing things Not at all   Q2: Feeling down, depressed or hopeless Not at all   PHQ-2 Score 0       Patient-reported           7/28/2025   Substance Use   Alcohol more than 3/day or more than 7/wk No   Do you have a current opioid prescription? No   How severe/bad is pain from 1 to 10? 5/10   Do you use any other substances recreationally? No     Social History     Tobacco Use    Smoking status: Never     Passive exposure: Never    Smokeless tobacco: Never   Vaping Use    Vaping status: Never Used   Substance Use Topics    Alcohol use: No    Drug use: No           5/18/2022   LAST FHS-7 RESULTS   1st degree relative breast or ovarian cancer No   Any relative bilateral breast cancer No   Any male have breast cancer No   Any ONE woman have BOTH breast AND ovarian cancer No   Any woman with breast cancer before 50yrs No   2 or more relatives with breast AND/OR  ovarian cancer No   2 or more relatives with breast AND/OR bowel cancer No        Mammogram Screening - After age 74- determine frequency with patient based on health status, life expectancy and patient goals    ASCVD Risk   The 10-year ASCVD risk score (Zeinab SEXTON, et al., 2019) is: 49.2%    Values used to calculate the score:      Age: 75 years      Sex: Female      Is Non- : Yes      Diabetic: Yes      Tobacco smoker: No      Systolic Blood Pressure: 141 mmHg      Is BP treated: No      HDL Cholesterol: 81 mg/dL      Total Cholesterol: 192 mg/dL            Reviewed and updated as needed this visit by Provider                    Past Medical History:   Diagnosis Date    Diabetes (H)     OA (osteoarthritis) of knee      Past Surgical History:   Procedure Laterality Date    NO HISTORY OF SURGERY       OB History    Para Term  AB Living   2 0 0 0 0 2   SAB IAB Ectopic Multiple Live Births   0 0 0 0 0      # Outcome Date GA Lbr Bryan/2nd Weight Sex Type Anes PTL Lv   2             1               Current providers sharing in care for this patient include:  Patient Care Team:  Sherrill Rubio DO as PCP - General (Family Practice)  Sherrill Rubio DO as Assigned PCP  Suzette Blanc MD as MD (Student in organized health care education/training program)  Mily Sutton, RN as Lead Care Coordinator (Primary Care - CC)    The following health maintenance items are reviewed in Epic and correct as of today:  Health Maintenance   Topic Date Due    RSV VACCINE (1 - 1-dose 75+ series) Never done    COVID-19 VACCINE ( season) 2024    EYE EXAM  2025    MEDICARE ANNUAL WELLNESS VISIT  2025    A1C  2025    DIABETIC FOOT EXAM  2025    ANNUAL REVIEW OF HM ORDERS  2025    INFLUENZA VACCINE (1) 2025    BMP  2025    LIPID  2025    MICROALBUMIN  2025    FALL RISK ASSESSMENT  2026     "ADVANCE CARE PLANNING  07/28/2030    DTAP/TDAP/TD VACCINE (3 - Td or Tdap) 12/23/2034    DEXA  11/08/2038    HEPATITIS C SCREENING  Completed    PHQ-2 (once per calendar year)  Completed    PNEUMOCOCCAL VACCINE 50+ YEARS  Completed    HPV VACCINE (No Doses Required) Completed    ZOSTER VACCINE  Completed    MENINGITIS VACCINE  Aged Out    MAMMO SCREENING  Discontinued    COLORECTAL CANCER SCREENING  Discontinued         Review of Systems  Constitutional, HEENT, cardiovascular, pulmonary, GI, , musculoskeletal, neuro, skin, endocrine and psych systems are negative, except as otherwise noted.     Objective    Exam  BP (!) 141/77   Pulse 66   Temp 97.9  F (36.6  C) (Oral)   Resp 18   Ht 1.547 m (5' 0.9\")   Wt 84.7 kg (186 lb 12.8 oz)   SpO2 100%   BMI 35.41 kg/m     Estimated body mass index is 35.41 kg/m  as calculated from the following:    Height as of this encounter: 1.547 m (5' 0.9\").    Weight as of this encounter: 84.7 kg (186 lb 12.8 oz).    Physical Exam  GENERAL: alert and no distress  NECK: no adenopathy, no asymmetry, masses, or scars  RESP: lungs clear to auscultation - no rales, rhonchi or wheezes  CV: regular rate and rhythm, normal S1 S2, no S3 or S4, no murmur, click or rub, no peripheral edema  ABDOMEN: soft, nontender, no hepatosplenomegaly, no masses and bowel sounds normal  MS: no gross musculoskeletal defects noted, no edema        7/28/2025   Mini Cog   Clock Draw Score 0 Abnormal   3 Item Recall 2 objects recalled   Mini Cog Total Score 2             Signed Electronically by: Sherrill Rubio DO"

## 2025-07-28 NOTE — COMMUNITY RESOURCES LIST (ENGLISH)
Housing  [employer]   Program Provider: [employer]  Program Website : [email or web address]  Next Steps: Go to [email or web address]    Program Locations:   Address:  [address] [place] [id number]   Distance:  3.5 mi   Office Phone Number: [phone]    Hours:   Monday: 8:00 AM - 5:00 PM   Tuesday: 8:00 AM - 5:00 PM   Wednesday: 8:00 AM - 5:00 PM   Thursday: 8:00 AM - 5:00 PM   Friday: 8:00 AM - 5:00 PM   Saturday: CLOSED   Sunday: CLOSED     Affordable Housing Online   Program Provider: [employer]  Program Website : [email or web address]  Next Steps: Go to [email or web address]    Program Locations:   Address:  [address] [place] [address]   Distance:  1019.47 mi     Hours:   Monday: 8:00 AM - 5:00 PM   Tuesday: 8:00 AM - 5:00 PM   Wednesday: 8:00 AM - 5:00 PM   Thursday: 8:00 AM - 5:00 PM   Friday: 8:00 AM - 5:00 PM   Saturday: CLOSED   Sunday: CLOSED

## 2025-07-28 NOTE — COMMUNITY RESOURCES LIST (PATIENT PREFERRED LANGUAGE)
???? ??  HousingLink   ????? ????: HousingLink  ?????? ?? ??? : [email or web address]  ??? ??????: ?? [email or web address] ???    ?????? ????:   ????:  [address] [place] [address]   ???:  3.5 mi   ??? ??? ???: [phone]    ????:   ??: 8:00 AM - 5:00 PM   ????: 8:00 AM - 5:00 PM   ???: 8:00 AM - 5:00 PM   ???: 8:00 AM - 5:00 PM   ???: 8:00 AM - 5:00 PM   ???: ??   ???: ??     ????? ???? ????? ??   ????? ????: Affordable Housing Online  ?????? ?? ??? : [email or web address]  ??? ??????: ?? [email or web address] ???    ?????? ????:   ????:  [address] [place] [address]   ???:  101[address].47 mi     ????:   ??: 8:00 AM - 5:00 PM   ????: 8:00 AM - 5:00 PM   ???: 8:00 AM - 5:00 PM   ???: 8:00 AM - 5:00 PM   ???: 8:00 AM - 5:00 PM   ???: ??   ???: ??

## 2025-07-29 ENCOUNTER — PATIENT OUTREACH (OUTPATIENT)
Dept: CARE COORDINATION | Facility: CLINIC | Age: 75
End: 2025-07-29
Payer: COMMERCIAL

## 2025-07-31 ENCOUNTER — PATIENT OUTREACH (OUTPATIENT)
Dept: CARE COORDINATION | Facility: CLINIC | Age: 75
End: 2025-07-31
Payer: COMMERCIAL

## 2025-08-28 ENCOUNTER — OFFICE VISIT (OUTPATIENT)
Dept: FAMILY MEDICINE | Facility: CLINIC | Age: 75
End: 2025-08-28
Payer: COMMERCIAL

## 2025-08-28 VITALS
DIASTOLIC BLOOD PRESSURE: 76 MMHG | HEART RATE: 68 BPM | WEIGHT: 180 LBS | RESPIRATION RATE: 16 BRPM | BODY MASS INDEX: 35.34 KG/M2 | SYSTOLIC BLOOD PRESSURE: 128 MMHG | OXYGEN SATURATION: 99 % | TEMPERATURE: 98 F | HEIGHT: 60 IN

## 2025-08-28 DIAGNOSIS — I10 ESSENTIAL HYPERTENSION: ICD-10-CM

## 2025-08-28 DIAGNOSIS — E11.9 TYPE 2 DIABETES MELLITUS WITHOUT RETINOPATHY (H): Primary | ICD-10-CM

## 2025-08-28 LAB
ANION GAP SERPL CALCULATED.3IONS-SCNC: 12 MMOL/L (ref 7–15)
BUN SERPL-MCNC: 9.4 MG/DL (ref 8–23)
CALCIUM SERPL-MCNC: 9.9 MG/DL (ref 8.8–10.4)
CHLORIDE SERPL-SCNC: 105 MMOL/L (ref 98–107)
CREAT SERPL-MCNC: 0.63 MG/DL (ref 0.51–0.95)
EGFRCR SERPLBLD CKD-EPI 2021: >90 ML/MIN/1.73M2
GLUCOSE SERPL-MCNC: 142 MG/DL (ref 70–99)
HCO3 SERPL-SCNC: 24 MMOL/L (ref 22–29)
POTASSIUM SERPL-SCNC: 4.2 MMOL/L (ref 3.4–5.3)
SODIUM SERPL-SCNC: 141 MMOL/L (ref 135–145)

## 2025-08-28 RX ORDER — LOSARTAN POTASSIUM 25 MG/1
25 TABLET ORAL DAILY
Qty: 90 TABLET | Refills: 0 | Status: SHIPPED | OUTPATIENT
Start: 2025-08-28

## 2025-08-28 ASSESSMENT — PAIN SCALES - GENERAL: PAINLEVEL_OUTOF10: NO PAIN (0)
